# Patient Record
Sex: MALE | URBAN - METROPOLITAN AREA
[De-identification: names, ages, dates, MRNs, and addresses within clinical notes are randomized per-mention and may not be internally consistent; named-entity substitution may affect disease eponyms.]

---

## 2022-03-18 PROBLEM — S80.12XA LEG HEMATOMA, LEFT, INITIAL ENCOUNTER: Status: ACTIVE | Noted: 2018-01-21

## 2022-03-18 PROBLEM — M25.552 LEFT HIP PAIN: Status: ACTIVE | Noted: 2018-01-21

## 2022-03-19 PROBLEM — R11.10 VOMITING: Status: ACTIVE | Noted: 2018-01-21

## 2022-03-19 PROBLEM — G31.09 OTHER FRONTOTEMPORAL DEMENTIA: Status: ACTIVE | Noted: 2018-01-21

## 2022-03-19 PROBLEM — I50.23 ACUTE ON CHRONIC SYSTOLIC CONGESTIVE HEART FAILURE (HCC): Status: ACTIVE | Noted: 2020-02-19

## 2022-03-19 PROBLEM — F02.80 OTHER FRONTOTEMPORAL DEMENTIA: Status: ACTIVE | Noted: 2018-01-21

## 2022-03-19 PROBLEM — J90 PLEURAL EFFUSION: Status: ACTIVE | Noted: 2018-10-12

## 2022-03-19 PROBLEM — I48.20 CHRONIC ATRIAL FIBRILLATION (HCC): Status: ACTIVE | Noted: 2020-02-19

## 2022-03-19 PROBLEM — N30.00 ACUTE CYSTITIS WITHOUT HEMATURIA: Status: ACTIVE | Noted: 2018-01-21

## 2022-03-19 PROBLEM — E86.0 DEHYDRATION: Status: ACTIVE | Noted: 2018-01-21

## 2022-03-19 PROBLEM — I48.91 ATRIAL FIBRILLATION WITH RAPID VENTRICULAR RESPONSE (HCC): Status: ACTIVE | Noted: 2018-10-12

## 2022-03-20 PROBLEM — I50.9 CHF (CONGESTIVE HEART FAILURE) (HCC): Status: ACTIVE | Noted: 2018-10-12

## 2022-03-20 PROBLEM — R06.00 DYSPNEA: Status: ACTIVE | Noted: 2020-02-19

## 2022-03-20 PROBLEM — R09.02 HYPOXIA: Status: ACTIVE | Noted: 2020-02-19

## 2022-09-29 ENCOUNTER — APPOINTMENT (OUTPATIENT)
Dept: CT IMAGING | Age: 87
DRG: 683 | End: 2022-09-29
Payer: MEDICARE

## 2022-09-29 ENCOUNTER — HOSPITAL ENCOUNTER (INPATIENT)
Age: 87
LOS: 8 days | Discharge: NURSING, REHAB OR LONG-TERM CARE | DRG: 683 | End: 2022-10-08
Attending: EMERGENCY MEDICINE | Admitting: INTERNAL MEDICINE
Payer: MEDICARE

## 2022-09-29 ENCOUNTER — APPOINTMENT (OUTPATIENT)
Dept: GENERAL RADIOLOGY | Age: 87
DRG: 683 | End: 2022-09-29
Payer: MEDICARE

## 2022-09-29 DIAGNOSIS — E86.0 DEHYDRATION: ICD-10-CM
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
## 2022-09-29 DIAGNOSIS — N17.9 ACUTE KIDNEY INJURY (HCC): ICD-10-CM

## 2022-09-29 PROCEDURE — 51701 INSERT BLADDER CATHETER: CPT

## 2022-09-29 PROCEDURE — 85025 COMPLETE CBC W/AUTO DIFF WBC: CPT

## 2022-09-29 PROCEDURE — 96374 THER/PROPH/DIAG INJ IV PUSH: CPT

## 2022-09-29 PROCEDURE — 71045 X-RAY EXAM CHEST 1 VIEW: CPT

## 2022-09-29 PROCEDURE — 93005 ELECTROCARDIOGRAM TRACING: CPT | Performed by: STUDENT IN AN ORGANIZED HEALTH CARE EDUCATION/TRAINING PROGRAM

## 2022-09-29 PROCEDURE — 99285 EMERGENCY DEPT VISIT HI MDM: CPT

## 2022-09-29 PROCEDURE — 80048 BASIC METABOLIC PNL TOTAL CA: CPT

## 2022-09-29 PROCEDURE — 70450 CT HEAD/BRAIN W/O DYE: CPT

## 2022-09-29 PROCEDURE — 84484 ASSAY OF TROPONIN QUANT: CPT

## 2022-09-29 PROCEDURE — 72125 CT NECK SPINE W/O DYE: CPT

## 2022-09-29 PROCEDURE — 83735 ASSAY OF MAGNESIUM: CPT

## 2022-09-29 RX ORDER — MEMANTINE HYDROCHLORIDE 10 MG/1
10 TABLET ORAL 2 TIMES DAILY
COMMUNITY

## 2022-09-29 RX ORDER — MEDROXYPROGESTERONE ACETATE 10 MG/1
10 TABLET ORAL 2 TIMES DAILY
COMMUNITY

## 2022-09-29 RX ORDER — ERGOCALCIFEROL (VITAMIN D2) 1250 MCG
50000 CAPSULE ORAL WEEKLY
COMMUNITY
Start: 2022-10-01

## 2022-09-29 RX ORDER — FUROSEMIDE 40 MG/1
40 TABLET ORAL DAILY
COMMUNITY

## 2022-09-29 RX ORDER — LORAZEPAM 1 MG/1
1 TABLET ORAL SEE ADMIN INSTRUCTIONS
Status: ON HOLD | COMMUNITY
End: 2022-10-05 | Stop reason: HOSPADM

## 2022-09-29 RX ORDER — FLUOXETINE HYDROCHLORIDE 20 MG/1
20 CAPSULE ORAL DAILY
COMMUNITY

## 2022-09-29 RX ORDER — CARVEDILOL 12.5 MG/1
12.5 TABLET ORAL 2 TIMES DAILY WITH MEALS
COMMUNITY

## 2022-09-29 RX ORDER — DONEPEZIL HYDROCHLORIDE 10 MG/1
10 TABLET, FILM COATED ORAL NIGHTLY
COMMUNITY

## 2022-09-30 PROBLEM — N17.9 AKI (ACUTE KIDNEY INJURY) (HCC): Status: ACTIVE | Noted: 2022-09-30

## 2022-09-30 LAB
AMORPHOUS: ABNORMAL /HPF
ANION GAP SERPL CALCULATED.3IONS-SCNC: 16 MMOL/L (ref 3–16)
BACTERIA: ABNORMAL /HPF
BASOPHILS ABSOLUTE: 0 K/UL (ref 0–0.2)
BASOPHILS RELATIVE PERCENT: 0.2 %
BILIRUBIN URINE: ABNORMAL
BLOOD, URINE: ABNORMAL
BUN BLDV-MCNC: 23 MG/DL (ref 7–20)
CALCIUM SERPL-MCNC: 9.7 MG/DL (ref 8.3–10.6)
CHLORIDE BLD-SCNC: 104 MMOL/L (ref 99–110)
CLARITY: CLEAR
CO2: 22 MMOL/L (ref 21–32)
COLOR: YELLOW
CREAT SERPL-MCNC: 2.5 MG/DL (ref 0.8–1.3)
EKG ATRIAL RATE: 131 BPM
EKG DIAGNOSIS: NORMAL
EKG Q-T INTERVAL: 274 MS
EKG QRS DURATION: 88 MS
EKG QTC CALCULATION (BAZETT): 395 MS
EKG R AXIS: -26 DEGREES
EKG T AXIS: 143 DEGREES
EKG VENTRICULAR RATE: 125 BPM
EOSINOPHILS ABSOLUTE: 0 K/UL (ref 0–0.6)
EOSINOPHILS RELATIVE PERCENT: 0.2 %
EPITHELIAL CELLS, UA: ABNORMAL /HPF (ref 0–5)
GFR AFRICAN AMERICAN: 30
GFR NON-AFRICAN AMERICAN: 24
GLUCOSE BLD-MCNC: 125 MG/DL (ref 70–99)
GLUCOSE URINE: NEGATIVE MG/DL
HCT VFR BLD CALC: 42.9 % (ref 40.5–52.5)
HEMOGLOBIN: 14.1 G/DL (ref 13.5–17.5)
KETONES, URINE: 15 MG/DL
LEUKOCYTE ESTERASE, URINE: ABNORMAL
LYMPHOCYTES ABSOLUTE: 2.6 K/UL (ref 1–5.1)
LYMPHOCYTES RELATIVE PERCENT: 20.6 %
MAGNESIUM: 2.3 MG/DL (ref 1.8–2.4)
MCH RBC QN AUTO: 29.2 PG (ref 26–34)
MCHC RBC AUTO-ENTMCNC: 32.9 G/DL (ref 31–36)
MCV RBC AUTO: 88.8 FL (ref 80–100)
MICROSCOPIC EXAMINATION: YES
MONOCYTES ABSOLUTE: 1.3 K/UL (ref 0–1.3)
MONOCYTES RELATIVE PERCENT: 10.2 %
MUCUS: ABNORMAL /LPF
NEUTROPHILS ABSOLUTE: 8.6 K/UL (ref 1.7–7.7)
NEUTROPHILS RELATIVE PERCENT: 68.8 %
NITRITE, URINE: NEGATIVE
PDW BLD-RTO: 15 % (ref 12.4–15.4)
PH UA: 6 (ref 5–8)
PLATELET # BLD: 210 K/UL (ref 135–450)
PMV BLD AUTO: 8.2 FL (ref 5–10.5)
POTASSIUM REFLEX MAGNESIUM: 4.5 MMOL/L (ref 3.5–5.1)
PROTEIN UA: 100 MG/DL
RBC # BLD: 4.84 M/UL (ref 4.2–5.9)
RBC UA: ABNORMAL /HPF (ref 0–4)
SODIUM BLD-SCNC: 142 MMOL/L (ref 136–145)
SPECIFIC GRAVITY UA: 1.02 (ref 1–1.03)
TROPONIN: 0.02 NG/ML
TROPONIN: 0.03 NG/ML
URINE REFLEX TO CULTURE: YES
URINE TYPE: ABNORMAL
UROBILINOGEN, URINE: 1 E.U./DL
WBC # BLD: 12.5 K/UL (ref 4–11)
WBC UA: >100 /HPF (ref 0–5)

## 2022-09-30 PROCEDURE — 87086 URINE CULTURE/COLONY COUNT: CPT

## 2022-09-30 PROCEDURE — 97535 SELF CARE MNGMENT TRAINING: CPT

## 2022-09-30 PROCEDURE — 97167 OT EVAL HIGH COMPLEX 60 MIN: CPT

## 2022-09-30 PROCEDURE — 6360000002 HC RX W HCPCS: Performed by: STUDENT IN AN ORGANIZED HEALTH CARE EDUCATION/TRAINING PROGRAM

## 2022-09-30 PROCEDURE — 6370000000 HC RX 637 (ALT 250 FOR IP): Performed by: INTERNAL MEDICINE

## 2022-09-30 PROCEDURE — 87077 CULTURE AEROBIC IDENTIFY: CPT

## 2022-09-30 PROCEDURE — 97116 GAIT TRAINING THERAPY: CPT

## 2022-09-30 PROCEDURE — 97162 PT EVAL MOD COMPLEX 30 MIN: CPT

## 2022-09-30 PROCEDURE — 2580000003 HC RX 258: Performed by: STUDENT IN AN ORGANIZED HEALTH CARE EDUCATION/TRAINING PROGRAM

## 2022-09-30 PROCEDURE — 2580000003 HC RX 258: Performed by: INTERNAL MEDICINE

## 2022-09-30 PROCEDURE — 1200000000 HC SEMI PRIVATE

## 2022-09-30 PROCEDURE — 97530 THERAPEUTIC ACTIVITIES: CPT

## 2022-09-30 PROCEDURE — 81001 URINALYSIS AUTO W/SCOPE: CPT

## 2022-09-30 PROCEDURE — 84484 ASSAY OF TROPONIN QUANT: CPT

## 2022-09-30 PROCEDURE — 99223 1ST HOSP IP/OBS HIGH 75: CPT | Performed by: NURSE PRACTITIONER

## 2022-09-30 PROCEDURE — 6370000000 HC RX 637 (ALT 250 FOR IP): Performed by: NURSE PRACTITIONER

## 2022-09-30 RX ORDER — SODIUM CHLORIDE 0.9 % (FLUSH) 0.9 %
5-40 SYRINGE (ML) INJECTION PRN
Status: DISCONTINUED | OUTPATIENT
Start: 2022-09-30 | End: 2022-10-08 | Stop reason: HOSPADM

## 2022-09-30 RX ORDER — CIMETIDINE HYDROCHLORIDE ORAL SOLUTION 300 MG/5ML
800 SOLUTION ORAL 2 TIMES DAILY
Status: DISCONTINUED | OUTPATIENT
Start: 2022-09-30 | End: 2022-10-06

## 2022-09-30 RX ORDER — MEMANTINE HYDROCHLORIDE 10 MG/1
10 TABLET ORAL 2 TIMES DAILY
Status: DISCONTINUED | OUTPATIENT
Start: 2022-09-30 | End: 2022-10-08 | Stop reason: HOSPADM

## 2022-09-30 RX ORDER — DONEPEZIL HYDROCHLORIDE 10 MG/1
10 TABLET, FILM COATED ORAL NIGHTLY
Status: DISCONTINUED | OUTPATIENT
Start: 2022-09-30 | End: 2022-10-08 | Stop reason: HOSPADM

## 2022-09-30 RX ORDER — SODIUM CHLORIDE 9 MG/ML
INJECTION, SOLUTION INTRAVENOUS PRN
Status: DISCONTINUED | OUTPATIENT
Start: 2022-09-30 | End: 2022-10-08 | Stop reason: HOSPADM

## 2022-09-30 RX ORDER — QUETIAPINE FUMARATE 100 MG/1
50 TABLET, FILM COATED ORAL DAILY
Status: ON HOLD | COMMUNITY
End: 2022-10-08 | Stop reason: HOSPADM

## 2022-09-30 RX ORDER — TRAZODONE HYDROCHLORIDE 50 MG/1
50 TABLET ORAL NIGHTLY
Status: ON HOLD | COMMUNITY
End: 2022-10-05 | Stop reason: SDUPTHER

## 2022-09-30 RX ORDER — SODIUM CHLORIDE, SODIUM LACTATE, POTASSIUM CHLORIDE, CALCIUM CHLORIDE 600; 310; 30; 20 MG/100ML; MG/100ML; MG/100ML; MG/100ML
1000 INJECTION, SOLUTION INTRAVENOUS CONTINUOUS
Status: DISCONTINUED | OUTPATIENT
Start: 2022-09-30 | End: 2022-10-01

## 2022-09-30 RX ORDER — POTASSIUM CHLORIDE 750 MG/1
10 CAPSULE, EXTENDED RELEASE ORAL DAILY
COMMUNITY

## 2022-09-30 RX ORDER — ASPIRIN 81 MG/1
81 TABLET ORAL DAILY
COMMUNITY

## 2022-09-30 RX ORDER — ONDANSETRON 2 MG/ML
4 INJECTION INTRAMUSCULAR; INTRAVENOUS EVERY 6 HOURS PRN
Status: DISCONTINUED | OUTPATIENT
Start: 2022-09-30 | End: 2022-10-08 | Stop reason: HOSPADM

## 2022-09-30 RX ORDER — SODIUM CHLORIDE 9 MG/ML
INJECTION, SOLUTION INTRAVENOUS CONTINUOUS
Status: DISCONTINUED | OUTPATIENT
Start: 2022-09-30 | End: 2022-09-30

## 2022-09-30 RX ORDER — CASTOR OIL AND BALSAM, PERU 788; 87 MG/G; MG/G
OINTMENT TOPICAL 2 TIMES DAILY
Status: DISCONTINUED | OUTPATIENT
Start: 2022-09-30 | End: 2022-10-08 | Stop reason: HOSPADM

## 2022-09-30 RX ORDER — MEDROXYPROGESTERONE ACETATE 10 MG/1
10 TABLET ORAL 2 TIMES DAILY
Status: DISCONTINUED | OUTPATIENT
Start: 2022-09-30 | End: 2022-10-08 | Stop reason: HOSPADM

## 2022-09-30 RX ORDER — SODIUM CHLORIDE 0.9 % (FLUSH) 0.9 %
5-40 SYRINGE (ML) INJECTION EVERY 12 HOURS SCHEDULED
Status: DISCONTINUED | OUTPATIENT
Start: 2022-09-30 | End: 2022-10-08 | Stop reason: HOSPADM

## 2022-09-30 RX ORDER — QUETIAPINE FUMARATE 100 MG/1
150 TABLET, FILM COATED ORAL NIGHTLY
Status: ON HOLD | COMMUNITY
End: 2022-10-05 | Stop reason: HOSPADM

## 2022-09-30 RX ORDER — ONDANSETRON 4 MG/1
4 TABLET, ORALLY DISINTEGRATING ORAL EVERY 8 HOURS PRN
Status: DISCONTINUED | OUTPATIENT
Start: 2022-09-30 | End: 2022-10-08 | Stop reason: HOSPADM

## 2022-09-30 RX ORDER — POLYETHYLENE GLYCOL 3350 17 G/17G
17 POWDER, FOR SOLUTION ORAL DAILY PRN
Status: DISCONTINUED | OUTPATIENT
Start: 2022-09-30 | End: 2022-10-08 | Stop reason: HOSPADM

## 2022-09-30 RX ORDER — ACETAMINOPHEN 325 MG/1
650 TABLET ORAL EVERY 6 HOURS PRN
Status: DISCONTINUED | OUTPATIENT
Start: 2022-09-30 | End: 2022-10-08 | Stop reason: HOSPADM

## 2022-09-30 RX ORDER — MONTELUKAST SODIUM 10 MG/1
10 TABLET ORAL DAILY
COMMUNITY

## 2022-09-30 RX ORDER — ACETAMINOPHEN 650 MG/1
650 SUPPOSITORY RECTAL EVERY 6 HOURS PRN
Status: DISCONTINUED | OUTPATIENT
Start: 2022-09-30 | End: 2022-10-08 | Stop reason: HOSPADM

## 2022-09-30 RX ADMIN — Medication: at 18:11

## 2022-09-30 RX ADMIN — CIMETIDINE HYDROCHLORIDE ORAL SOLUTION 800 MG: 300 SOLUTION ORAL at 21:20

## 2022-09-30 RX ADMIN — DEXTROSE MONOHYDRATE 1000 MG: 5 INJECTION INTRAVENOUS at 02:12

## 2022-09-30 RX ADMIN — SODIUM CHLORIDE, POTASSIUM CHLORIDE, SODIUM LACTATE AND CALCIUM CHLORIDE 1000 ML: 600; 310; 30; 20 INJECTION, SOLUTION INTRAVENOUS at 11:02

## 2022-09-30 RX ADMIN — Medication: at 21:18

## 2022-09-30 RX ADMIN — SODIUM CHLORIDE, POTASSIUM CHLORIDE, SODIUM LACTATE AND CALCIUM CHLORIDE 1000 ML: 600; 310; 30; 20 INJECTION, SOLUTION INTRAVENOUS at 01:12

## 2022-09-30 RX ADMIN — SODIUM CHLORIDE, PRESERVATIVE FREE 10 ML: 5 INJECTION INTRAVENOUS at 21:17

## 2022-09-30 RX ADMIN — MEMANTINE HYDROCHLORIDE 10 MG: 10 TABLET ORAL at 21:16

## 2022-09-30 RX ADMIN — DONEPEZIL HYDROCHLORIDE 10 MG: 10 TABLET, FILM COATED ORAL at 21:16

## 2022-09-30 RX ADMIN — SODIUM CHLORIDE, POTASSIUM CHLORIDE, SODIUM LACTATE AND CALCIUM CHLORIDE 1000 ML: 600; 310; 30; 20 INJECTION, SOLUTION INTRAVENOUS at 21:25

## 2022-09-30 RX ADMIN — Medication: at 22:05

## 2022-09-30 RX ADMIN — MEDROXYPROGESTERONE ACETATE 10 MG: 10 TABLET ORAL at 21:18

## 2022-09-30 ASSESSMENT — PAIN SCALES - PAIN ASSESSMENT IN ADVANCED DEMENTIA (PAINAD)
BREATHING: 0
FACIALEXPRESSION: 0
NEGVOCALIZATION: 0
FACIALEXPRESSION: 0
TOTALSCORE: 0
CONSOLABILITY: 0
BODYLANGUAGE: 0
CONSOLABILITY: 0
TOTALSCORE: 0
BODYLANGUAGE: 0
BODYLANGUAGE: 0
TOTALSCORE: 0
TOTALSCORE: 0
BREATHING: 0
BODYLANGUAGE: 0
CONSOLABILITY: 0
NEGVOCALIZATION: 0
BREATHING: 0
BREATHING: 0
NEGVOCALIZATION: 0
TOTALSCORE: 0
CONSOLABILITY: 0
CONSOLABILITY: 0
FACIALEXPRESSION: 0
FACIALEXPRESSION: 0
NEGVOCALIZATION: 0
NEGVOCALIZATION: 0
BODYLANGUAGE: 0
FACIALEXPRESSION: 0
BREATHING: 0

## 2022-09-30 NOTE — PROGRESS NOTES
Physical Therapy  Facility/Department: [address]  Physical Therapy Initial Assessment and treatment/discharge    Name: David Lynch  : 1934  MRN: [id number]  Date of Service: 2022    Discharge Recommendations:    David Lynch scored a 10/24 on the AM-PAC short mobility form. At this time, no further PT is recommended upon discharge. Recommend patient returns to prior setting with prior services. PT Equipment Recommendations  Equipment Needed: No      Patient Diagnosis(es): The primary encounter diagnosis was Fall, initial encounter. Diagnoses of Acute kidney injury ([address] and Dehydration were also pertinent to this visit. Past Medical History:  has a past medical history of Systolic CHF ([address] Past Surgical History:  has no past surgical history on file. Assessment   Body Structures, Functions, Activity Limitations Requiring Skilled Therapeutic Intervention: Decreased functional mobility ; Decreased safe awareness;Decreased cognition;Decreased endurance;Decreased balance;Decreased strength  Assessment: Patient tolerated session fair with mobility being limited due to overall decreased cognition. Patient resistive to bed mobility requiring total assist x 2 to transfer from supine to sit. Patient able to transfer to stand and ambulate with mod assist/HHA requiring physical assist for guidance and initiation of all movements. Patient requiring increased time to transfer to sit on toilet due to difficulty understanding verbal and tactile cues for completion of tasks. Patient with advanced dementia, disoriented x 4 with minimal command following throughout session. Patient is from memory care unit where he is typically independent with ambulation per chart. Patient functioning below baseline level but with overall limited ability to participate and progress with skilled PT. Recommend patient return to memory care with PT evaluation to determine further needs.   Treatment Diagnosis: impaired mobility associated with dehydration  Therapy Prognosis: Poor  Decision Making: Medium Complexity  Requires PT Follow-Up: No  Activity Tolerance  Activity Tolerance: Treatment limited secondary to decreased cognition     Plan   Physcial Therapy Plan  General Plan: Discharge with evaluation only  Safety Devices  Type of Devices: Telesitter in use, Left in chair, Call light within reach, Chair alarm in place, Gait belt (chair in reclined position)     Restrictions  Position Activity Restriction  Other position/activity restrictions: up with assistance     Subjective   Pain: Patient unable to verbalize  General  Chart Reviewed: Yes  Patient assessed for rehabilitation services?: Yes  Additional Pertinent Hx: Patient is an 81 y/o male admitted 9/29 from nursing facility with suspected fall. CT head (+) for Chronic bilateral cerebral convexity subdural hematomas, (-) for acute findings. Chest x-ray (-) for acute findings. Response To Previous Treatment: Not applicable  Family / Caregiver Present: No  Referring Practitioner: Keith Weinberg MD  Referral Date : 09/30/22  Diagnosis: dehydration  Follows Commands: Impaired  Other (Comment): minimal command following  General Comment  Comments: Patient supine in bed upon arrival.  Subjective  Subjective: Patient sleeping but aroused with sternal rub; RN cleared patient for treatment.          Social/Functional History  Social/Functional History  Type of Home: Facility [facility]  Additional Comments: Patient unable to provide any information on prior level of function; per chart, it appears that patient was an independent ambulator but admitted with fall  Vision/Hearing  Vision  Vision:  (unable to accuarately assess due to advanced dementia but appears [facility] with ambulation)  Hearing  Hearing:  (unable to accurately assess due to advanced dementia but appears that patient may be hard of hearing)    Cognition   Orientation  Overall Orientation Status: Impaired  Orientation Level: Disoriented X4  Cognition  Overall Cognitive Status: Exceptions  Arousal/Alertness: Delayed responses to stimuli  Following Commands: Inconsistently follows commands (continuous verbal and tactile cues required for completion of tasks, hand over hand assist, physical guidance for ambulation and sit<>stand transfers)  Attention Span: Difficulty attending to directions  Memory: Decreased recall of biographical Information  Safety Judgement: Decreased awareness of need for assistance;Decreased awareness of need for safety  Problem Solving: Decreased awareness of errors  Insights: Not aware of deficits  Initiation: Requires cues for all  Sequencing: Requires cues for all  Cognition Comment: advanced dementia     Objective   Heart Rate: 96  Heart Rate Source: Monitor  BP: 118/75  BP Location: Left upper arm  BP Method: Automatic  MAP (Calculated): 89.33  Resp: 18  SpO2: 96 %  O2 Device: None (Room air)              AROM RLE (degrees)  RLE AROM: WFL  AROM LLE (degrees)  LLE AROM : WFL  Strength RLE  Strength RLE: Exception  Comment: appears grossly weakened with functional mobility  Strength LLE  Strength LLE: Exception  Comment: appears grossly weakened with functional mobility        Balance  Sitting:  (CGA progressing to SBA)  Standing:  (CG/Min A static standing)  Gait  Overall Level of Assistance: Moderate assistance (bilateral hand held assist - 3' bed to chair + ~10' (x2) to/from toilet)  Bed mobility  Supine to Sit: 2 Person assistance;Dependent/Total (resistive)  Scooting: Maximal assistance (to EOB)  Bed Mobility Comments: patient sitting up in bedside chair at end of session  Transfers  Sit to Stand: Minimal Assistance; Moderate Assistance (min/mod assist from EOB and from toilet, no initiation of tasks with physical assist to initiate)  Stand to Sit: Minimal Assistance; Moderate Assistance (min/mod assist x 1 to toilet and to bedside chair, verbal cues and physical assist to initiate movement, resistive at times)  Bed to Chair: Moderate assistance (to take steps with HHA)  Ambulation  Surface: Level tile  Device: Hand-Held Assist  Assistance: Moderate assistance  Quality of Gait: gait mildly unsteady but no overt loss of balance noted, significantly decreased step length/height bilaterally with shuffling steps, decreased leon, physical guidance for steps  Distance: 5-6 steps from EOB to bedside chair, 10' into bathroom, 10' back to bedside chair  More Ambulation?: No  Stairs/Curb  Stairs?: No     Balance  Sitting - Static: Fair  Standing - Static: Fair;- (min assist)  Standing - Dynamic: Poor (mod assist to ambulate)           OutComes Score                                                  AM-PAC Score  AM-PAC Inpatient Mobility Raw Score : 10 (09/30/22 1027)  AM-PAC Inpatient T-Scale Score : 32.29 (09/30/22 1027)  Mobility Inpatient CMS 0-100% Score: 76.75 (09/30/22 1027)  Mobility Inpatient CMS G-Code Modifier : CL (09/30/22 1027)          Tinneti Score       Goals  Short Term Goals  Time Frame for Short Term Goals: none; patient will be discharged from acute PT  Patient Goals   Patient Goals : unable to state       Education  Patient Education  Education Given To: Patient  Education Provided: Role of Therapy  Education Method: Verbal;Demonstration  Barriers to Learning: Cognition  Education Outcome: Unable to demonstrate understanding; Unable to verbalize      Therapy Time   Individual Concurrent Group Co-treatment   Time In 0810         Time Out 0850         Minutes 40         Timed Code Treatment Minutes: 25 Minutes   Timed Code Treatment Minutes:  25 Minutes    Total Treatment Minutes:    25 minutes treatment + 15 minutes evaluation = 40 total treatment minutes    Mendoza ORTIZ [id number]

## 2022-09-30 NOTE — H&P
Hospital Medicine History & Physical      PCP: Peterson Mays DO    Date of Admission: 9/29/2022    Date of Service: Pt seen/examined on 9/30/2022 and Admitted to Inpatient with expected LOS greater than two midnights due to medical therapy. Chief Complaint: Suspected fall      History Of Present Illness:   80 y.o. male who presents from his SNF for suspected fall as the nursing staff has noticed some bruising on his face (not appreciated currently). Patient has advanced dementia and it has been his first day at this facility. Patient has history of wandering around and he was found in another resident's room wedged between the bed and the wall with possible bruise to his face. Patient was sent to ER for further evaluation. Upon my evaluation patient is awake but does not follow commands and unable to contribute to history given his advanced dementia. Past Medical History:      No past medical history on file. Unable to obtain given advanced dementia  Past Surgical History:      No past surgical history on file. Unable to obtain given advanced dementia  Medications Prior to Admission:      Prior to Admission medications    Medication Sig Start Date End Date Taking? Authorizing Provider   carvedilol (COREG) 12.5 MG tablet Take 12.5 mg by mouth 2 times daily (with meals)   Yes Historical Provider, MD   CIMETIDINE 200 PO Take 200 mg by mouth in the morning and at bedtime   Yes Historical Provider, MD   donepezil (ARICEPT) 10 MG tablet Take 10 mg by mouth nightly   Yes Historical Provider, MD   ergocalciferol (ERGOCALCIFEROL) 1.25 MG (81705 UT) capsule Take 50,000 Units by mouth once a week   Yes Historical Provider, MD   FLUoxetine (PROZAC) 20 MG capsule Take 20 mg by mouth daily   Yes Historical Provider, MD   furosemide (LASIX) 40 MG tablet Take 40 mg by mouth in the morning and 40 mg in the evening.    Yes Historical Provider, MD   LORazepam (ATIVAN) 1 MG tablet Take 1 mg by mouth every 6 hours as needed for Anxiety. Yes Historical Provider, MD   memantine (NAMENDA) 10 MG tablet Take 10 mg by mouth 2 times daily   Yes Historical Provider, MD   medroxyPROGESTERone (PROVERA) 10 MG tablet Take 10 mg by mouth in the morning and at bedtime   Yes Historical Provider, MD       Allergies:  Patient has no known allergies. Social History:      The patient currently lives at [facility]:   has no history on file for tobacco use. ETOH:   has no history on file for alcohol use. E-cigarette/Vaping       Questions Responses    E-cigarette/Vaping Use     Start Date     Passive Exposure     Quit Date     Counseling Given     Comments               Family History:    Reviewed and negative in regards to presenting illness/complaint. No family history on file. Unable to obtain due to advanced dementia    REVIEW OF SYSTEMS COMPLETED:     Patient cannot contribute to history due to advanced dementia    PHYSICAL EXAM PERFORMED:    BP 98/65   Pulse (!) 104   Temp 97.9 °F (36.6 °C) (Oral)   Resp 16   Wt 197 lb 8 oz (89.6 kg)   SpO2 97%     General appearance:  No apparent distress, appears stated age. Pleasantly confused and does not follow commands. HEENT:  Normal cephalic, atraumatic without obvious deformity. Pupils equal, round, and reactive to light. Extra ocular muscles intact. Conjunctivae/corneas clear. No bruising noted on the face  Neck: Supple, with full range of motion. No jugular venous distention. Trachea midline. Respiratory:  Normal respiratory effort. Clear to auscultation, bilaterally without Rales/Wheezes/Rhonchi. Cardiovascular: Irregularly irregular rhythm, tachycardic, with normal S1/S2 without murmurs, rubs or gallops. Abdomen: Soft, non-tender, non-distended with normal bowel sounds. Musculoskeletal:  No clubbing, cyanosis or edema bilaterally. Full range of motion without deformity. Skin: Skin color, texture, turgor normal.  No rashes or lesions.   Neurologic:  Cranial nerves: II-XII intact, grossly non-focal.  Psychiatric:  Alert, pleasantly confused. Does not follow commands. Capillary Refill: Brisk,3 seconds, normal  Peripheral Pulses: +2 palpable, equal bilaterally       Labs:     Recent Labs     09/29/22 2339   WBC 12.5*   HGB 14.1   HCT 42.9        Recent Labs     09/29/22 2339      K 4.5      CO2 22   BUN 23*   CREATININE 2.5*   CALCIUM 9.7     No results for input(s): AST, ALT, BILIDIR, BILITOT, ALKPHOS in the last 72 hours. No results for input(s): INR in the last 72 hours. Recent Labs     09/29/22 2339 09/30/22  0036   TROPONINI 0.03* 0.02*       Urinalysis:      Lab Results   Component Value Date/Time    NITRU Negative 09/30/2022 01:16 AM    WBCUA >100 09/30/2022 01:16 AM    BACTERIA 3+ 09/30/2022 01:16 AM    RBCUA see below 09/30/2022 01:16 AM    BLOODU TRACE-INTACT 09/30/2022 01:16 AM    SPECGRAV 1.025 09/30/2022 01:16 AM    GLUCOSEU Negative 09/30/2022 01:16 AM       Radiology:     CXR: I have reviewed the CXR with the following interpretation: As below  EKG:  I have reviewed the EKG with the following interpretation: Atrial fibrillation, VR = 125, normal intervals, no acute ST-T changes    XR CHEST PORTABLE   Final Result      No acute pulmonary disease. CT CSpine W/O Contrast   Final Result      1. Chronic bilateral cerebral convexity subdural hematomas. No midline shift. 2.  No acute intracranial abnormality. 3.  No evidence of acute fracture in the cervical spine. 4.  Large anterior osteophytes at C2-C5 level. Note that this is often incidental and asymptomatic but can contribute to dysphagia. CT Head W/O Contrast   Final Result      1. Chronic bilateral cerebral convexity subdural hematomas. No midline shift. 2.  No acute intracranial abnormality. 3.  No evidence of acute fracture in the cervical spine. 4.  Large anterior osteophytes at C2-C5 level.  Note that this is often incidental and asymptomatic but can contribute to dysphagia. Consults:    IP CONSULT TO NEUROSURGERY  IP CONSULT TO HOSPITALIST  IP CONSULT TO SOCIAL WORK    ASSESSMENT:    Active Hospital Problems    Diagnosis Date Noted    YANY (acute kidney injury) ([facility] [address] [N17.9] 09/30/2022     Priority: Medium   #Suspected fall  #Chronic bilateral SDH with no midline shift  #CT cervical spine with large anterior osteophytes at C2-C5 level  #YANY  #Suspected acute cystitis  #Elevated troponin, trending down. EKG nonischemic. Likely secondary to tachycardia  #Advanced dementia  #Mild leukocytosis with left shift    PLAN:  -Continue on IV hydration  -Hold Lasix  -Monitor renal function and electrolytes  -Trend cardiac enzymes  -Heart rate control, with trial of IV hydration and treatment of infection  -Continue on IV antibiotics, follow urine cultures  -Neurosurgery has been consulted by ED provider  -Fall precautions  -PT/OT  -Social service consult for discharge planning  -Continue on Aricept and Namenda  -Will need verification of nursing home medications in a.m.  -Supportive therapy      DVT Prophylaxis: SCDs  Diet: ADULT DIET; Regular  Code Status: Full Code    PT/OT Eval Status: As tolerated with assistance and fall precautions    Dispo -GMF with telemetry       Kasey Adair MD    Thank you Luis Carpio DO for the opportunity to be involved in this patient's care. If you have any questions or concerns please feel free to contact me at [phone].

## 2022-09-30 NOTE — ED PROVIDER NOTES
ED Attending Attestation Note     Date of evaluation: 9/29/2022    This patient was seen by the resident. I have seen and examined the patient, agree with the workup, evaluation, management and diagnosis. The care plan has been discussed. I have reviewed the ECG and concur with the resident's interpretation. My assessment reveals patient presents from nursing facility for unwitnessed fall. Patient was reportedly found down in a room in the facility without a witness to the fall. Patient has dementia so cannot describe circumstances of the fall. Patient has no evidence of trauma on exam.  Will check baseline laboratory studies, imaging.      Madelaine Dang MD  09/29/22 2830

## 2022-09-30 NOTE — CONSULTS
NEUROSURGERY CONSULT NOTE       Jaswinder Woo DO is requesting this consult. Reason for Consult: Bilateral hygromas  Admission Chief Complaint: fall at nursing facility     History of Present Illness     Dom Dobson is a 80 y.o. y/o male with dementia w/ behavioral issues, CHF who presents after being found down at nursing facility. He has been at new nursing facility for only 1 day. He was discharged from prior facility due to behavioral concerns. Family has had a difficult time placing patient in new facility. Had 30 day stay at [facility] (psychiatry) and was discharged on stable medication regimen, it appears his newer facilities do not have an accurate medication list from his stay at [facility]. He was found to have UTI as well and is being currently treated for this. Per daughter at bedside patient appears to be at baseline. REVIEW OF SYSTEMS:   Patient does not answer questions. Past Medical, Surgical, Family, and Social History   PAST MEDICAL HISTORY:  Past Medical History:   Diagnosis Date    Systolic CHF [facility])      SURGICAL HISTORY:  No past surgical history on file. FAMILY HISTORY & SOCIAL HISTORY:  Family history non-contributory  No family history on file. Allergies & Outpatient Medications   ALLERGIES:  No Known Allergies  HOME MEDICATIONS:  Current Discharge Medication List        CONTINUE these medications which have NOT CHANGED    Details   carvedilol (COREG) 12.5 MG tablet Take 12.5 mg by mouth 2 times daily (with meals)      CIMETIDINE 200 PO Take 200 mg by mouth in the morning and at bedtime      donepezil (ARICEPT) 10 MG tablet Take 10 mg by mouth nightly      ergocalciferol (ERGOCALCIFEROL) 1.25 MG (73891 UT) capsule Take 50,000 Units by mouth once a week      FLUoxetine (PROZAC) 20 MG capsule Take 20 mg by mouth daily      furosemide (LASIX) 40 MG tablet Take 40 mg by mouth in the morning and 40 mg in the evening.       LORazepam (ATIVAN) 1 MG tablet Take 1 mg by mouth every 6 hours as needed for Anxiety. memantine (NAMENDA) 10 MG tablet Take 10 mg by mouth 2 times daily      medroxyPROGESTERone (PROVERA) 10 MG tablet Take 10 mg by mouth in the morning and at bedtime               Physical Exam   PHYSICAL EXAM:  /75   Pulse 96   Temp 97.6 °F (36.4 °C) (Axillary)   Resp 18   Wt 197 lb 8 oz (89.6 kg)   SpO2 96%     General Alert, no distress, well-nourished    Neurologic  Mental status:   Alert, regards, does not answer any questions  Mumbles some words    Cranial nerves:   Pupils equal  Gaze conjugate  Face symmetric     Motor Exam:  Moves all extremities - no focal weakness noted      Sensory: SARAH  Tone: normal in all 4 extremities    Cardiovascular: Warm, appears well perfused   Respiratory: Easy, non-labored respiratory pattern   Abdominal: Abdomen is without distention   Extremities: Upper and lower extremities are atraumatic in appearance without deformity. Diagnostic Results   IMAGES:  Images personally reviewed and agree w/ radiology interpretation. Head CT  Impression       1. Chronic bilateral cerebral convexity subdural hematomas. No midline shift. 2.  No acute intracranial abnormality. 3.  No evidence of acute fracture in the cervical spine. 4.  Large anterior osteophytes at C2-C5 level. Note that this is often incidental and asymptomatic but can contribute to dysphagia. LABS:  All results below personally reviewed. Pertinent positives & negatives are addressed in Impression & Recommendations below.      LABS   Metabolic Panel Recent Labs     09/29/22  2339      K 4.5      CO2 22   BUN 23*   CREATININE 2.5*   GLUCOSE 125*   CALCIUM 9.7   MG 2.30      CBC / Coags Recent Labs     09/29/22  2339   WBC 12.5*   RBC 4.84   HGB 14.1   HCT 42.9         Other Lab Results   Component Value Date/Time    LABA1C 6.0 10/05/2019 08:26 AM    LDLCALC 89.8 10/05/2019 08:26 AM    TRIG 76 10/05/2019 08:26 AM    [id number] 301 10/05/2019 08:26 AM    FOLATE 17.5 10/05/2019 08:26 AM     No results found for: PHENYTOIN, KEPPRA, LACOSA, LAMO, VALPROATE, LACTSEPSIS, LACTA       CURRENT SCHEDULED MEDICATIONS   Inpatient Medications     memantine, 10 mg, Oral, BID    donepezil, 10 mg, Oral, Nightly    sodium chloride flush, 5-40 mL, IntraVENous, 2 times per day    [START ON 10/1/2022] cefTRIAXone (ROCEPHIN) IV, 1,000 mg, IntraVENous, Q24H   Infusions    lactated ringers 1,000 mL (09/30/22 0112)    sodium chloride        Antibiotics   Recent Abx Admin                     cefTRIAXone (ROCEPHIN) 1,000 mg in dextrose 5 % 50 mL IVPB mini-bag (mg) 1,000 mg New Bag 09/30/22 0212                       IMPRESSION & RECOMMENDATIONS     Patient is an 81 y/o M presents s/p fall at nursing facility, head Ct shows bilateral hygroma, severe atrophy consistent with patients history of dementia    Bilateral hygromas show no signs of compression on the brain tissue. Unlikely to be related to acute changes in mental status or balance. Discussed medications with Daughter - reordered home medication Provera / Tagamet - patient placed on these medications to help with sexual behaviors. Continue home Aricept / Namenda   Avoid benzos as able    He would benefit from outpatient consult with Neurology  No benefit for inpatient consult with his acute changes likely related to UTI. Recommend follow up w/ Dr. Santo Cantu at [address] [place] [address] [id number]    No neurosurgical follow up needed  We will sign off  Please call with questions.          TIFFANIE Erickson - CNP   9/30/2022 9:40 AM

## 2022-09-30 NOTE — PROGRESS NOTES
This is an 79 yo male admitted earlier this morning by my colleague. Seen with family at bedside. They report he appears to be doing much better.      Continue treatment for UTI  Resume home meds - advised family they can bring in to hospital and can dose   Continue IV fluids  Monitor for urinary retention  Neurosurgery consulted, outpatient follow-up with neurology recommended  PT/OT    Yelitza Benitez DO  Attending hospitalist

## 2022-09-30 NOTE — PROGRESS NOTES
Occupational Therapy  Facility/Department: [facility] 113 6 [facility] 166  Occupational Therapy Initial Assessment and Treatment  Discharge    Name: Jhony Valencia  : 1934  MRN: [id number]  Date of Service: 2022    Discharge Recommendations:  Jhony Valencia scored a 8/24 on the -[facility] ADL Inpatient form. At this time, no further OT is recommended upon discharge due to appears baseline status - requires assist for ADLs at baseline. Recommend patient returns to prior setting with prior services. OT Equipment Recommendations  Equipment Needed: No       Patient Diagnosis(es): The primary encounter diagnosis was Fall, initial encounter. Diagnoses of Acute kidney injury ([facility] and Dehydration were also pertinent to this visit. Past Medical History:  has a past medical history of Systolic CHF ([facility] Past Surgical History:  has no past surgical history on file. Assessment   Assessment: Pt presenting from NH s/p apparent fall. Pt unable to provide information regarding PLOF 2* advanced dementia. Per chart review, pt ambulatory and wanders. Pt w/ signficant cognitive impairment w/ inability to follow simple directions or carryover learning. Not appropriate for skilled OT. Will sign off. Recommend pt return to NH w/ increased assist as needed. Will sign off. REQUIRES OT FOLLOW-UP: No  Activity Tolerance  Activity Tolerance: Treatment limited secondary to decreased cognition        Plan   Discharge acute OT - no further needs. Restrictions  Position Activity Restriction  Other position/activity restrictions: up with assistance    Subjective   General  Chart Reviewed: Yes  Patient assessed for rehabilitation services?: Yes  Additional Pertinent Hx: 80 y.o. M who presents with concern for fall (found in another residents room wedged between the bed and wall with possible bruise to face).        Hospital Course: CT Head:  Chronic bilateral cerebral convexity subdural hematomas, (-) acute; CT c-spine: (-) fx, Large anterior osteophytes at C2-C5 level. PMH: Advanced Dementia, Systolic CHF. Family / Caregiver Present: No  Referring Practitioner: Yamini Amanda MD  Diagnosis: Dehydration    Subjective  Subjective: In bed on arrival - sleeping. Responds to name, but unable to state name. Few verbalizations that were unintelligible. Did say \"Can I have water? \"    Patient unable to verbalize    Social/Functional History  Social/Functional History  Type of Home: Facility [facility]  Additional Comments: Patient unable to provide any information on prior level of function; per chart, it appears that patient was an independent ambulator but admitted with fall         Objective                  Safety Devices  Type of Devices: Telesitter in use;Left in chair;Call light within reach; Chair alarm in place;Gait belt (chair in reclined position)    Balance  Sitting:  (CGA progressing to SBA)  Standing:  (CG/Min A static standing)    Gait  Overall Level of Assistance: Moderate assistance (bilateral hand held assist - 3' bed to chair + ~10' (x2) to/from toilet)    Toilet Transfers  Toilet - Technique: Ambulating  Equipment Used: Standard toilet  Toilet Transfer: Moderate assistance  Toilet Transfers Comments: Note: increased time and physical prompting to sit down on toilet    AROM:  (assisted w/ rowing w/ BUE; unable to formally assess 2* inability to follow complex directions)  Strength:  (resistive strength WFL)    ADL  Feeding: Beverage management;Stand by assistance  Grooming: Maximum assistance (wash face w/ washcloth - hand over hand initiation)  LE Dressing: Dependent/Total (change depends)  Toileting: Dependent/Total       Activity Tolerance  Activity Tolerance: Treatment limited secondary to decreased cognition    Bed mobility  Supine to Sit: 2 Person assistance;Dependent/Total (resistive)  Scooting: Maximal assistance (to EOB)    Transfers  Sit to stand:  Moderate assistance  Stand to sit: Moderate assistance  Transfer Comments: Note: physical prompting    Vision  Vision:  (unable to accuarately assess due to advanced dementia but appears [facility] with ambulation)  Hearing  Hearing:  (unable to accurately assess due to advanced dementia but appears that patient may be hard of hearing)    Cognition  Overall Cognitive Status: Exceptions  Arousal/Alertness: Delayed responses to stimuli  Following Commands: Inconsistently follows commands (continuous verbal and tactile cues required for completion of tasks, hand over hand assist, physical guidance for ambulation and sit<>stand transfers)  Attention Span: Difficulty attending to directions  Memory: Decreased recall of biographical Information  Safety Judgement: Decreased awareness of need for assistance;Decreased awareness of need for safety  Problem Solving: Decreased awareness of errors  Insights: Not aware of deficits  Initiation: Requires cues for all  Sequencing: Requires cues for all  Cognition Comment: advanced dementia  Orientation  Overall Orientation Status: Impaired  Orientation Level: Disoriented X4                    Education Given To: Patient  Education Provided: Role of Therapy  Education Method: Verbal  Barriers to Learning: Cognition  Education Outcome: Unable to verbalize; Unable to demonstrate understanding               Pt seen by OT for eval and treat.  Treatment included: bed mobility, functional transfer/mobility, functional transfer/mobility                                                           AM-PAC Score        AM-[facility] Inpatient Daily Activity Raw Score: 8 (09/30/22 1026)  AM-PAC Inpatient ADL T-Scale Score : 22.86 (09/30/22 1026)  ADL Inpatient CMS 0-100% Score: 85.69 (09/30/22 1026)  ADL Inpatient CMS G-Code Modifier : CM (09/30/22 1026)              Therapy Time   Individual Concurrent Group Co-treatment   Time In 0810         Time Out 0850         Minutes 40          Timed Code Treatment Minutes:  25 Minutes    Total Treatment Minutes: [place] OTR/L #[id number]

## 2022-09-30 NOTE — CARE COORDINATION
Case Management Assessment           Initial Evaluation                Date / Time of Evaluation: 9/30/2022 4:20 PM                 Assessment Completed by: DANNY Hollis, ARINW    Patient Name: Dom Dobson     YOB: 1934  Diagnosis: Dehydration [E86.0]  YANY (acute kidney injury) ([facility] [N17.9]  Acute kidney injury ([facility] [N17.9]  Fall, initial encounter [W19. XXXA]     Date / Time: 9/29/2022  9:31 PM    Patient Admission Status: Inpatient    If patient is discharged prior to next notation, then this note serves as note for discharge by case management. Current PCP: Evette Bernal DO  Clinic Patient: No    Chart Reviewed: Yes  Patient/ Family Interviewed: Yes    Initial assessment completed at bedside with:     Hospitalization in the last 30 days: No    Emergency Contacts:  Extended Emergency Contact Information  Primary Emergency Contact: Adam Darnell Phone: [phone]  Relation: Child    Advance Directives:   Code Status: Full Code    Healthcare Power of :   Agent:   Contact Number:     Financial  Payor: MEDICARE / Plan: Selvin March / Product Type: *No Product type* /     Pre-cert required for SNF: No    Pharmacy  No Pharmacies Listed    Potential assistance Purchasing Medications:    Does Patient want to participate in local refill/ meds to beds program?:      Meds To Beds General Rules:  1. Can ONLY be done Monday- Friday between 8:30am-5pm  2. Prescription(s) must be in pharmacy by 3pm to be filled same day  3. Copy of patient's insurance/ prescription drug card and patient face sheet must be sent along with the prescription(s)  4. Cost of Rx cannot be added to hospital bill. If financial assistance is needed, please contact unit  or ;  or  CANNOT provide pharmacy voucher for patients co-pays  5.  Patients can then  the prescription on their way out of the hospital at discharge, or pharmacy can deliver to the bedside if staff is available. (payment due at time of pick-up or delivery - cash, check, or card accepted)     Able to afford home medications/ co-pay costs: Yes    ADLS  Support Systems:      PT AM-PAC: 10 /24  OT AM-PAC: 8 /24    [place]: Gareth Agudelo [facility] [address] Memory Care/LTC  Steps: none    Plans to RETURN to current housing: Yes  Barriers to RETURNING to current housing: NA      Durable Medical Equipment  Equipment: walker    Home Oxygen and [address] prior to admission: No    Dialysis  Active with HD/PD prior to admission: No    DISCHARGE PLAN:  Disposition: [facility] (LTC): [facility]  Phone: [phone]  Fax: [phone]    Transportation PLAN for discharge: EMS transportation     Factors facilitating achievement of predicted outcomes: Caregiver support    Barriers to discharge: Medical complications    Additional Case Management Notes:  Pt is from [facility] [address] CM spoke with Korin Thacker in admissions [phone] who confirmed that pt will return to LTC and receive rehab therapy services there. She will be the weekend contact. CM will continue to follow for DC needs and recs. The Plan for Transition of Care is related to the following treatment goals of Dehydration [E86.0]  YANY (acute kidney injury) ([facility] [N17.9]  Acute kidney injury ([facility] [N17.9]  Fall, initial encounter [W19. XXXA]    The Patient and/or patient representative Rickey Long and his family were provided with a choice of provider and agrees with the discharge plan Yes    Freedom of choice list was provided with basic dialogue that supports the patient's individualized plan of care/goals and shares the quality data associated with the providers.  Yes    Care Transition patient: No    DANNY Shukla, [facility]  Case Management Department  Ph: [phone]   Fax: [phone]

## 2022-09-30 NOTE — ED PROVIDER NOTES
[address] ENCOUNTER          EM RESIDENT NOTE       Date of evaluation: 9/29/2022    Chief Complaint     Fall      of Present Illness     Diego Morillo is a 80 y.o. male who presents with concern for fall. He is a new resident at his nursing home with history of dementia including episodes of wandering. He was found in another residents room wedged between the bed and wall with possible bruise to face therefore was sent to the emergency department with concern for injury sustained in fall. At arrival he is awake but cannot contribute a history or review of systems. Information above obtained in discussion with sending facility. Review of Systems   Unable to obtain due to advanced dementia. Past Medical, Surgical, Family, and Social History     He has no past medical history on file. He has no past surgical history on file. His family history is not on file. He     Medications     Previous Medications    CARVEDILOL (COREG) 12.5 MG TABLET    Take 12.5 mg by mouth 2 times daily (with meals)    CIMETIDINE 200 PO    Take 200 mg by mouth in the morning and at bedtime    DONEPEZIL (ARICEPT) 10 MG TABLET    Take 10 mg by mouth nightly    ERGOCALCIFEROL (ERGOCALCIFEROL) 1.25 MG (69376 UT) CAPSULE    Take 50,000 Units by mouth once a week    FLUOXETINE (PROZAC) 20 MG CAPSULE    Take 20 mg by mouth daily    FUROSEMIDE (LASIX) 40 MG TABLET    Take 40 mg by mouth in the morning and 40 mg in the evening. LORAZEPAM (ATIVAN) 1 MG TABLET    Take 1 mg by mouth every 6 hours as needed for Anxiety. MEDROXYPROGESTERONE (PROVERA) 10 MG TABLET    Take 10 mg by mouth in the morning and at bedtime    MEMANTINE (NAMENDA) 10 MG TABLET    Take 10 mg by mouth 2 times daily       Allergies     He has No Known Allergies.     Physical Exam     INITIAL VITALS: BP: 100/85, Temp: 97.9 °F (36.6 °C), Heart Rate: 90, Resp: 18, SpO2: 93 %     General: alert in no distress; no verbal   Skin: warm, dry and pink without noted rashes or lesions  Head: normocephalic atraumatic  Eyes: Pupils equal, extra ocular movements grossly intact  Mouth / Pharynx: moist mucus membranes without erythema or lesions noted  Neck: full range of motion without meningismus  Chest: no external findings or tenderness; clear to auscultation  Heart: regular normal S1 and S2 without murmur  noted  Abdomen: negative external findings, + bowel sounds, soft and non tender; no rebound; non distended  Extremities: well perfused with palpable distal pulses; full range of motion grossly intact, no edema    DiagnosticResults     EKG   Interpreted in conjunction with emergencydepartment physician Sara Cantrell MD  Afib ventricular rate of 125 without ectopy; normal axis; unable to calculate MARKELL otherwise normal intervals; no evidence of acute ischemia. RADIOLOGY:  XR CHEST PORTABLE   Final Result      No acute pulmonary disease. CT CSpine W/O Contrast   Final Result      1. Chronic bilateral cerebral convexity subdural hematomas. No midline shift. 2.  No acute intracranial abnormality. 3.  No evidence of acute fracture in the cervical spine. 4.  Large anterior osteophytes at C2-C5 level. Note that this is often incidental and asymptomatic but can contribute to dysphagia. CT Head W/O Contrast   Final Result      1. Chronic bilateral cerebral convexity subdural hematomas. No midline shift. 2.  No acute intracranial abnormality. 3.  No evidence of acute fracture in the cervical spine. 4.  Large anterior osteophytes at C2-C5 level. Note that this is often incidental and asymptomatic but can contribute to dysphagia.                 LABS:   Results for orders placed or performed during the hospital encounter of 09/29/22   BMP w/ Reflex to MG   Result Value Ref Range    Sodium 142 136 - 145 mmol/L    Potassium reflex Magnesium 4.5 3.5 - 5.1 mmol/L    Chloride 104 99 - 110 mmol/L    CO2 22 21 - 32 mmol/L    Anion Gap 16 3 - 16    Glucose 125 (H) 70 - 99 mg/dL    BUN 23 (H) 7 - 20 mg/dL    Creatinine 2.5 (H) 0.8 - 1.3 mg/dL    GFR Non-African American 24 (A) >60    GFR  30 (A) >60    Calcium 9.7 8.3 - 10.6 mg/dL   CBC with Auto Differential   Result Value Ref Range    WBC 12.5 (H) 4.0 - 11.0 K/uL    RBC 4.84 4.20 - 5.90 M/uL    Hemoglobin 14.1 13.5 - 17.5 g/dL    Hematocrit 42.9 40.5 - 52.5 %    MCV 88.8 80.0 - 100.0 fL    MCH 29.2 26.0 - 34.0 pg    MCHC 32.9 31.0 - 36.0 g/dL    RDW 15.0 12.4 - 15.4 %    Platelets 489 245 - 642 K/uL    MPV 8.2 5.0 - 10.5 fL    Neutrophils % 68.8 %    Lymphocytes % 20.6 %    Monocytes % 10.2 %    Eosinophils % 0.2 %    Basophils % 0.2 %    Neutrophils Absolute 8.6 (H) 1.7 - 7.7 K/uL    Lymphocytes Absolute 2.6 1.0 - 5.1 K/uL    Monocytes Absolute 1.3 0.0 - 1.3 K/uL    Eosinophils Absolute 0.0 0.0 - 0.6 K/uL    Basophils Absolute 0.0 0.0 - 0.2 K/uL   Troponin   Result Value Ref Range    Troponin 0.03 (H) <0.01 ng/mL   Magnesium   Result Value Ref Range    Magnesium 2.30 1.80 - 2.40 mg/dL   Urinalysis with Reflex to Culture    Specimen: Urine   Result Value Ref Range    Urine Type Voided    Troponin   Result Value Ref Range    Troponin 0.02 (H) <0.01 ng/mL       ED BEDSIDE ULTRASOUND:  na    RECENT VITALS:  BP: 94/69, Temp: 97.9 °F (36.6 °C), Heart Rate: (!) 115,Resp: 18, SpO2: 95 %     Procedures     na    ED Course     Nursing Notes, Past Medical Hx, Past Surgical Hx, Social Hx, Allergies, and Family Hx were reviewed. The patient was given the followingmedications:  Orders Placed This Encounter   Medications    lactated ringers infusion 1,000 mL         CONSULTS:  [address]  / MICHELLE / Shahnaz Santamariacherri is a 80 y.o. male who presents for evaluation after a fall. There are no apparent injuries. He is noted to have HR around 120 with history of Afib; remains HDS.  Will complete labs and imaging to assess for high risk features / injury. The remainder of his ED course is as follows:    ED Course as of 09/30/22 0131   Fri Sep 30, 2022   0031 Patient remains HDS in little distress; workup is notable for chronic subdurals but unclear from record review when these occurred and perhaps have not been formally evaluated. Labs reveal YANY, mild leukocytosis, mild elevation of troponin - will trend; given this will benefit from IVF and admission after discussion with NSGY. [NG]   0045 Discussed with NSGY; will evaluate on admission but no indication for acute intervention; no AED indicated; will benefit from CT in 2 weeks to assess resolution of SDH. [NG]   0102 Will discuss with HM in anticipation of admission and this will conclude ED course. [NG]      ED Course User Index  [NG] Bethany Harris MD         This patient was also evaluated by the attending physician. All care plans werediscussed and agreed upon. Clinical Impression     1. Fall, initial encounter    2. Acute kidney injury ([address]    3. Dehydration        Disposition     PATIENT REFERRED TO:  No follow-up provider specified.     DISCHARGE MEDICATIONS:  New Prescriptions    No medications on file       DISPOSITION Decision To Admit 09/30/2022 12:32:55 AM       Bethany Harris MD  Resident  09/30/22 [id number]

## 2022-09-30 NOTE — ED TRIAGE NOTES
Patient arrived in the ER after a fall. Patient is from [address] states that the fall was un witness.

## 2022-10-01 LAB
ALBUMIN SERPL-MCNC: 3.3 G/DL (ref 3.4–5)
ANION GAP SERPL CALCULATED.3IONS-SCNC: 15 MMOL/L (ref 3–16)
ANION GAP SERPL CALCULATED.3IONS-SCNC: 15 MMOL/L (ref 3–16)
BUN BLDV-MCNC: 15 MG/DL (ref 7–20)
BUN BLDV-MCNC: 16 MG/DL (ref 7–20)
CALCIUM SERPL-MCNC: 8.5 MG/DL (ref 8.3–10.6)
CALCIUM SERPL-MCNC: 8.5 MG/DL (ref 8.3–10.6)
CHLORIDE BLD-SCNC: 105 MMOL/L (ref 99–110)
CHLORIDE BLD-SCNC: 105 MMOL/L (ref 99–110)
CO2: 17 MMOL/L (ref 21–32)
CO2: 17 MMOL/L (ref 21–32)
CREAT SERPL-MCNC: 1 MG/DL (ref 0.8–1.3)
CREAT SERPL-MCNC: 1 MG/DL (ref 0.8–1.3)
GFR AFRICAN AMERICAN: >60
GFR AFRICAN AMERICAN: >60
GFR NON-AFRICAN AMERICAN: >60
GFR NON-AFRICAN AMERICAN: >60
GLUCOSE BLD-MCNC: 131 MG/DL (ref 70–99)
GLUCOSE BLD-MCNC: 132 MG/DL (ref 70–99)
PHOSPHORUS: 2.9 MG/DL (ref 2.5–4.9)
POTASSIUM REFLEX MAGNESIUM: 4.2 MMOL/L (ref 3.5–5.1)
POTASSIUM SERPL-SCNC: 4.2 MMOL/L (ref 3.5–5.1)
SODIUM BLD-SCNC: 137 MMOL/L (ref 136–145)
SODIUM BLD-SCNC: 137 MMOL/L (ref 136–145)

## 2022-10-01 PROCEDURE — 6360000002 HC RX W HCPCS: Performed by: INTERNAL MEDICINE

## 2022-10-01 PROCEDURE — 2580000003 HC RX 258: Performed by: INTERNAL MEDICINE

## 2022-10-01 PROCEDURE — 36415 COLL VENOUS BLD VENIPUNCTURE: CPT

## 2022-10-01 PROCEDURE — 6370000000 HC RX 637 (ALT 250 FOR IP): Performed by: INTERNAL MEDICINE

## 2022-10-01 PROCEDURE — 80069 RENAL FUNCTION PANEL: CPT

## 2022-10-01 PROCEDURE — 1200000000 HC SEMI PRIVATE

## 2022-10-01 PROCEDURE — 6370000000 HC RX 637 (ALT 250 FOR IP): Performed by: NURSE PRACTITIONER

## 2022-10-01 RX ORDER — HEPARIN SODIUM 5000 [USP'U]/ML
5000 INJECTION, SOLUTION INTRAVENOUS; SUBCUTANEOUS EVERY 8 HOURS SCHEDULED
Status: DISCONTINUED | OUTPATIENT
Start: 2022-10-01 | End: 2022-10-08 | Stop reason: HOSPADM

## 2022-10-01 RX ORDER — ASPIRIN 81 MG/1
81 TABLET ORAL DAILY
Status: DISCONTINUED | OUTPATIENT
Start: 2022-10-01 | End: 2022-10-08 | Stop reason: HOSPADM

## 2022-10-01 RX ORDER — TRAZODONE HYDROCHLORIDE 50 MG/1
50 TABLET ORAL NIGHTLY
Status: DISCONTINUED | OUTPATIENT
Start: 2022-10-01 | End: 2022-10-05

## 2022-10-01 RX ORDER — LANOLIN ALCOHOL/MO/W.PET/CERES
6 CREAM (GRAM) TOPICAL NIGHTLY PRN
Status: DISCONTINUED | OUTPATIENT
Start: 2022-10-01 | End: 2022-10-08 | Stop reason: HOSPADM

## 2022-10-01 RX ORDER — QUETIAPINE FUMARATE 25 MG/1
50 TABLET, FILM COATED ORAL DAILY
Status: DISCONTINUED | OUTPATIENT
Start: 2022-10-01 | End: 2022-10-06

## 2022-10-01 RX ORDER — SODIUM CHLORIDE 9 MG/ML
INJECTION, SOLUTION INTRAVENOUS CONTINUOUS
Status: DISCONTINUED | OUTPATIENT
Start: 2022-10-01 | End: 2022-10-02

## 2022-10-01 RX ADMIN — Medication 6 MG: at 02:01

## 2022-10-01 RX ADMIN — HEPARIN SODIUM 5000 UNITS: 5000 INJECTION INTRAVENOUS; SUBCUTANEOUS at 14:39

## 2022-10-01 RX ADMIN — MEDROXYPROGESTERONE ACETATE 10 MG: 10 TABLET ORAL at 09:21

## 2022-10-01 RX ADMIN — CIMETIDINE HYDROCHLORIDE ORAL SOLUTION 800 MG: 300 SOLUTION ORAL at 09:22

## 2022-10-01 RX ADMIN — MEMANTINE HYDROCHLORIDE 10 MG: 10 TABLET ORAL at 09:21

## 2022-10-01 RX ADMIN — ASPIRIN 81 MG: 81 TABLET, COATED ORAL at 12:40

## 2022-10-01 RX ADMIN — HEPARIN SODIUM 5000 UNITS: 5000 INJECTION INTRAVENOUS; SUBCUTANEOUS at 22:07

## 2022-10-01 RX ADMIN — SODIUM CHLORIDE, POTASSIUM CHLORIDE, SODIUM LACTATE AND CALCIUM CHLORIDE 1000 ML: 600; 310; 30; 20 INJECTION, SOLUTION INTRAVENOUS at 09:30

## 2022-10-01 RX ADMIN — CEFTRIAXONE 1000 MG: 1 INJECTION, POWDER, FOR SOLUTION INTRAMUSCULAR; INTRAVENOUS at 01:37

## 2022-10-01 RX ADMIN — SODIUM CHLORIDE: 9 INJECTION, SOLUTION INTRAVENOUS at 12:42

## 2022-10-01 RX ADMIN — QUETIAPINE FUMARATE 50 MG: 25 TABLET ORAL at 12:41

## 2022-10-01 ASSESSMENT — PAIN SCALES - PAIN ASSESSMENT IN ADVANCED DEMENTIA (PAINAD)
NEGVOCALIZATION: 0
TOTALSCORE: 2
TOTALSCORE: 2
BODYLANGUAGE: 1
TOTALSCORE: 2
CONSOLABILITY: 1
TOTALSCORE: 2
NEGVOCALIZATION: 0
NEGVOCALIZATION: 0
BODYLANGUAGE: 1
FACIALEXPRESSION: 0
TOTALSCORE: 2
BODYLANGUAGE: 1
TOTALSCORE: 0
CONSOLABILITY: 1
FACIALEXPRESSION: 0
FACIALEXPRESSION: 0
CONSOLABILITY: 1
TOTALSCORE: 2
TOTALSCORE: 2
FACIALEXPRESSION: 0
NEGVOCALIZATION: 0
BODYLANGUAGE: 1
CONSOLABILITY: 0
CONSOLABILITY: 1
BODYLANGUAGE: 0
NEGVOCALIZATION: 0
FACIALEXPRESSION: 0
BREATHING: 0
CONSOLABILITY: 1
FACIALEXPRESSION: 0
BODYLANGUAGE: 1
NEGVOCALIZATION: 0
BREATHING: 0
NEGVOCALIZATION: 0
NEGVOCALIZATION: 0
BODYLANGUAGE: 1
BREATHING: 0
BREATHING: 0
CONSOLABILITY: 1
BREATHING: 0
BODYLANGUAGE: 1
BREATHING: 0
FACIALEXPRESSION: 0
CONSOLABILITY: 1
BREATHING: 0
BREATHING: 0
FACIALEXPRESSION: 0

## 2022-10-01 NOTE — PLAN OF CARE
Problem: Discharge Planning  Goal: Discharge to home or other facility with appropriate resources  Note: Family has many concerns about patient returning to current facility. They spoke to case management, referral made to new facility. Problem: Pain  Goal: Verbalizes/displays adequate comfort level or baseline comfort level  Note: No s/s of pain. Problem: Chronic Conditions and Co-morbidities  Goal: Patient's chronic conditions and co-morbidity symptoms are monitored and maintained or improved  Note: Patient has dementia. He is often trying to climb out of bed. Camera in room. He is very confused. Problem: Skin/Tissue Integrity  Goal: Absence of new skin breakdown  Description: 1. Monitor for areas of redness and/or skin breakdown  2. Assess vascular access sites hourly  3. Every 4-6 hours minimum:  Change oxygen saturation probe site  4. Every 4-6 hours:  If on nasal continuous positive airway pressure, respiratory therapy assess nares and determine need for appliance change or resting period. Note: Area under abdominal panus, red, moist, and open. Anti fungal cream applied. Sacral area red and tender. He is incontinent of urine. Venelex applied. Foam dressing in place. Heels elevated. He repositions frequently on his own.

## 2022-10-01 NOTE — PROGRESS NOTES
Pt was awake throughout the night, trying to pull out his iv line and  trying to jump out from the bed. Doctor notified for prescription of sleep pill, he did prescribe melatonin 6mg. It was given but no effectiveness was seen, pt still awake even after taken the melatonin pill.will continue to monitor.

## 2022-10-01 NOTE — CARE COORDINATION
CM met with the pt and his family at the request of his daughter/PARI, Charissa Wise. The family is from [place] and would prefer a nursing facility closer to their home. MrMary Vallejo has a history of dementia with behavioral disturbances. He has been hospitalized in psychiatric facilities multiple times in the last few months for medication management of behaviors. Of most concern has been acting out out sexually. Darrian Haile has reached out to many facilities attempting to find alternative placement for her father. She requested a referral to [facility]. CM agreed to make the referral. Explained if he is not accepted, it cannot delay the discharge. Also, explained Medicare will not cover the full cost of  transport to [address] [place] [address]. There would be an out of pocket cost to pay upfront. Darrian Haile verbalized understanding. CM faxed a packet to Armin in admissions at [facility] [phone]. Iveth's direct phone number is [phone]. Attempted to leave a message but the mailbox is full. Per Rosalina Ortiz stated the referral would be reviewed on Monday and a decision made as soon as possible.     [facility]  Address: [address], [address]      Electronically signed by MALKA Martínez RN-[facility] ACM-RN  Case Management  [phone]

## 2022-10-01 NOTE — CONSULTS
Pharmacy Consult Note  - Admission Medication Reconciliation      Pharmacy consulted for reconciliation of [id number] medications. I reviewed med list faxed from [facility]      The following changes made to [id number] medication list:      Dose or Frequency CHANGE:  1) Lorazepam - on NH med list 2 times:  1mg q6h scheduled, and 1mg q6h PRN           Current Outpatient Medications   Medication Instructions    aspirin EC 81 mg, Oral, DAILY    carvedilol (COREG) 12.5 mg, Oral, 2 TIMES DAILY WITH MEALS, Hold for SBP <110 OR HR <60    CIMETIDINE 200  mg, Oral, 2 times daily    donepezil (ARICEPT) 10 mg, Oral, NIGHTLY    ergocalciferol (ERGOCALCIFEROL) 50,000 Units, Oral, WEEKLY    FLUoxetine (PROZAC) 20 mg, Oral, DAILY    furosemide (LASIX) 40 mg, Oral, DAILY    LORazepam (ATIVAN) 1 mg, Oral, EVERY 6 HOURS PRN    medroxyPROGESTERone (PROVERA) 10 mg, Oral, 2 times daily    memantine (NAMENDA) 10 mg, Oral, 2 TIMES DAILY    montelukast (SINGULAIR) 10 mg, Oral, DAILY    potassium chloride (MICRO-K) 10 MEQ extended release capsule 10 mEq, Oral, DAILY    QUEtiapine (SEROQUEL) 150 mg, Oral, NIGHTLY    QUEtiapine (SEROQUEL) 50 mg, Oral, DAILY    traZODone (DESYREL) 50 mg, Oral, NIGHTLY       Thank you for the consult    Please call with any questions:    Maryse Noland  PharmMary D[facility]  [phone]

## 2022-10-01 NOTE — PROGRESS NOTES
Hospitalist Progress Note      PCP: Katie Fried DO    Date of Admission: 9/29/2022    Chief Complaint: Wandering at SNF, found in another resident's room wedged between the bed and the wall. Subjective:    Unable to sleep, family visiting this morning. No distress. Severe dementia at baseline. Medications:  Reviewed    Infusion Medications    lactated ringers 1,000 mL (10/01/22 0930)    sodium chloride       Scheduled Medications    memantine  10 mg Oral BID    donepezil  10 mg Oral Nightly    sodium chloride flush  5-40 mL IntraVENous 2 times per day    cefTRIAXone (ROCEPHIN) IV  1,000 mg IntraVENous Q24H    medroxyPROGESTERone  10 mg Oral BID    cimetidine  800 mg Oral BID    Venelex   Topical BID     PRN Meds: melatonin, sodium chloride flush, sodium chloride, ondansetron **OR** ondansetron, polyethylene glycol, acetaminophen **OR** acetaminophen      Intake/Output Summary (Last 24 hours) at 10/1/2022 0942  Last data filed at 9/30/2022 1821  Gross per 24 hour   Intake 320 ml   Output --   Net 320 ml       Exam:    /81   Pulse (!) 13   Temp 99 °F (37.2 °C) (Axillary)   Resp 16   Wt 197 lb 8 oz (89.6 kg)   SpO2 92%     General appearance: No apparent distress, appears stated age and cooperative. HEENT: Pupils equal, round, and reactive to light. Conjunctivae/corneas clear. Neck: Supple, with full range of motion. No jugular venous distention. Trachea midline. Respiratory:  Normal respiratory effort. Clear to auscultation, bilaterally without Rales/Wheezes/Rhonchi. Cardiovascular: Regular rate and rhythm with normal S1/S2 without murmurs, rubs or gallops. Abdomen: Soft, non-tender, non-distended with normal bowel sounds. Musculoskelatal: No clubbing, cyanosis or edema bilaterally. Skin: Skin color, texture, turgor normal.  No rashes or lesions.   Neurologic:  Cranial nerves: II-XII intact, grossly non-focal.  Psychiatric: Alert and oriented, thought content appropriate, normal insight    Labs:   Recent Labs     09/29/22 2339   WBC 12.5*   HGB 14.1   HCT 42.9        Recent Labs     09/29/22 2339      K 4.5      CO2 22   BUN 23*   CREATININE 2.5*   CALCIUM 9.7     No results for input(s): AST, ALT, BILIDIR, BILITOT, ALKPHOS in the last 72 hours. No results for input(s): INR in the last 72 hours. Recent Labs     09/29/22 2339 09/30/22  0036   TROPONINI 0.03* 0.02*       Studies:  XR CHEST PORTABLE   Final Result      No acute pulmonary disease. CT CSpine W/O Contrast   Final Result      1. Chronic bilateral cerebral convexity subdural hematomas. No midline shift. 2.  No acute intracranial abnormality. 3.  No evidence of acute fracture in the cervical spine. 4.  Large anterior osteophytes at C2-C5 level. Note that this is often incidental and asymptomatic but can contribute to dysphagia. CT Head W/O Contrast   Final Result      1. Chronic bilateral cerebral convexity subdural hematomas. No midline shift. 2.  No acute intracranial abnormality. 3.  No evidence of acute fracture in the cervical spine. 4.  Large anterior osteophytes at C2-C5 level. Note that this is often incidental and asymptomatic but can contribute to dysphagia.                 Assessment/Plan:    Active Hospital Problems    Diagnosis Date Noted    YANY (acute kidney injury) ([facility] [address] [N17.9] 09/30/2022     Priority: Medium       Suspected fall  Chronic b/l SDH  Neurosurgery c/s, appreciate recs  Ok to resume home ASA    YANY  Bladder scans  IV fluids  Obtain follow-up lab    Suspected UTI  Continue Rocephin  Follow-up urine Cx    Elevated troponin, likely due to demand    Atrial fibrillation  Not on A/c per notes due to bleeding risk and recurrent falls    Sever Dementia with   SLP abhilash  Went through med/list at [facility] however unsure if entirely accurate, does not have Tagamet or Provera which 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

## 2022-10-02 LAB
ALBUMIN SERPL-MCNC: 3.7 G/DL (ref 3.4–5)
ANION GAP SERPL CALCULATED.3IONS-SCNC: 10 MMOL/L (ref 3–16)
BUN BLDV-MCNC: 10 MG/DL (ref 7–20)
CALCIUM SERPL-MCNC: 8.3 MG/DL (ref 8.3–10.6)
CHLORIDE BLD-SCNC: 104 MMOL/L (ref 99–110)
CO2: 25 MMOL/L (ref 21–32)
CREAT SERPL-MCNC: 0.9 MG/DL (ref 0.8–1.3)
GFR AFRICAN AMERICAN: >60
GFR NON-AFRICAN AMERICAN: >60
GLUCOSE BLD-MCNC: 98 MG/DL (ref 70–99)
PHOSPHORUS: 3.2 MG/DL (ref 2.5–4.9)
POTASSIUM SERPL-SCNC: 4 MMOL/L (ref 3.5–5.1)
SODIUM BLD-SCNC: 139 MMOL/L (ref 136–145)

## 2022-10-02 PROCEDURE — 6360000002 HC RX W HCPCS: Performed by: INTERNAL MEDICINE

## 2022-10-02 PROCEDURE — 6370000000 HC RX 637 (ALT 250 FOR IP): Performed by: NURSE PRACTITIONER

## 2022-10-02 PROCEDURE — 6370000000 HC RX 637 (ALT 250 FOR IP): Performed by: INTERNAL MEDICINE

## 2022-10-02 PROCEDURE — 80069 RENAL FUNCTION PANEL: CPT

## 2022-10-02 PROCEDURE — 2580000003 HC RX 258: Performed by: INTERNAL MEDICINE

## 2022-10-02 PROCEDURE — 36415 COLL VENOUS BLD VENIPUNCTURE: CPT

## 2022-10-02 PROCEDURE — 1200000000 HC SEMI PRIVATE

## 2022-10-02 RX ORDER — LORAZEPAM 1 MG/1
1 TABLET ORAL EVERY 6 HOURS PRN
Status: DISCONTINUED | OUTPATIENT
Start: 2022-10-02 | End: 2022-10-08 | Stop reason: HOSPADM

## 2022-10-02 RX ADMIN — HEPARIN SODIUM 5000 UNITS: 5000 INJECTION INTRAVENOUS; SUBCUTANEOUS at 14:22

## 2022-10-02 RX ADMIN — QUETIAPINE FUMARATE 150 MG: 100 TABLET ORAL at 20:35

## 2022-10-02 RX ADMIN — DONEPEZIL HYDROCHLORIDE 10 MG: 10 TABLET, FILM COATED ORAL at 20:35

## 2022-10-02 RX ADMIN — HEPARIN SODIUM 5000 UNITS: 5000 INJECTION INTRAVENOUS; SUBCUTANEOUS at 05:52

## 2022-10-02 RX ADMIN — CEFTRIAXONE 1000 MG: 1 INJECTION, POWDER, FOR SOLUTION INTRAMUSCULAR; INTRAVENOUS at 01:48

## 2022-10-02 RX ADMIN — TRAZODONE HYDROCHLORIDE 50 MG: 50 TABLET, FILM COATED ORAL at 20:35

## 2022-10-02 RX ADMIN — SODIUM CHLORIDE, PRESERVATIVE FREE 10 ML: 5 INJECTION INTRAVENOUS at 09:37

## 2022-10-02 RX ADMIN — Medication: at 20:36

## 2022-10-02 RX ADMIN — MEDROXYPROGESTERONE ACETATE 10 MG: 10 TABLET ORAL at 09:33

## 2022-10-02 RX ADMIN — MEMANTINE HYDROCHLORIDE 10 MG: 10 TABLET ORAL at 20:35

## 2022-10-02 RX ADMIN — CIMETIDINE HYDROCHLORIDE ORAL SOLUTION 800 MG: 300 SOLUTION ORAL at 09:33

## 2022-10-02 RX ADMIN — HEPARIN SODIUM 5000 UNITS: 5000 INJECTION INTRAVENOUS; SUBCUTANEOUS at 20:41

## 2022-10-02 RX ADMIN — QUETIAPINE FUMARATE 50 MG: 25 TABLET ORAL at 09:33

## 2022-10-02 RX ADMIN — CIMETIDINE HYDROCHLORIDE ORAL SOLUTION 800 MG: 300 SOLUTION ORAL at 20:40

## 2022-10-02 RX ADMIN — ASPIRIN 81 MG: 81 TABLET, COATED ORAL at 09:33

## 2022-10-02 RX ADMIN — Medication: at 09:36

## 2022-10-02 RX ADMIN — MEMANTINE HYDROCHLORIDE 10 MG: 10 TABLET ORAL at 09:33

## 2022-10-02 ASSESSMENT — PAIN SCALES - PAIN ASSESSMENT IN ADVANCED DEMENTIA (PAINAD)
NEGVOCALIZATION: 0
BODYLANGUAGE: 0
NEGVOCALIZATION: 0
CONSOLABILITY: 1
BODYLANGUAGE: 1
FACIALEXPRESSION: 0
TOTALSCORE: 2
BREATHING: 0
TOTALSCORE: 0
TOTALSCORE: 2
CONSOLABILITY: 0
FACIALEXPRESSION: 0
TOTALSCORE: 2
TOTALSCORE: 2
BREATHING: 0
CONSOLABILITY: 1
NEGVOCALIZATION: 0
BREATHING: 0
CONSOLABILITY: 1
CONSOLABILITY: 1
BODYLANGUAGE: 1
FACIALEXPRESSION: 0
NEGVOCALIZATION: 0
BODYLANGUAGE: 1
FACIALEXPRESSION: 0
BREATHING: 0
NEGVOCALIZATION: 0
NEGVOCALIZATION: 0
FACIALEXPRESSION: 0
BODYLANGUAGE: 1
FACIALEXPRESSION: 0
CONSOLABILITY: 1
BREATHING: 0
TOTALSCORE: 2
BODYLANGUAGE: 1
BREATHING: 0

## 2022-10-02 ASSESSMENT — PAIN SCALES - WONG BAKER
WONGBAKER_NUMERICALRESPONSE: 2
WONGBAKER_NUMERICALRESPONSE: 0

## 2022-10-02 NOTE — PROGRESS NOTES
Hospitalist Progress Note      PCP: Peterson Mays DO    Date of Admission: 9/29/2022    Chief Complaint: Wandering at SNF, found in another resident's room wedged between the bed and the wall. Subjective:    Pt is in restraints. No distress at this time. Medications:  Reviewed    Infusion Medications    sodium chloride 100 mL/hr at 10/01/22 1811    sodium chloride       Scheduled Medications    QUEtiapine  150 mg Oral Nightly    QUEtiapine  50 mg Oral Daily    aspirin EC  81 mg Oral Daily    traZODone  50 mg Oral Nightly    heparin (porcine)  5,000 Units SubCUTAneous 3 times per day    memantine  10 mg Oral BID    donepezil  10 mg Oral Nightly    sodium chloride flush  5-40 mL IntraVENous 2 times per day    cefTRIAXone (ROCEPHIN) IV  1,000 mg IntraVENous Q24H    medroxyPROGESTERone  10 mg Oral BID    cimetidine  800 mg Oral BID    Venelex   Topical BID     PRN Meds: melatonin, sodium chloride flush, sodium chloride, ondansetron **OR** ondansetron, polyethylene glycol, acetaminophen **OR** acetaminophen      Intake/Output Summary (Last 24 hours) at 10/2/2022 1058  Last data filed at 10/2/2022 0600  Gross per 24 hour   Intake 3226.88 ml   Output 0 ml   Net 3226.88 ml         Exam:    BP (!) 147/102   Pulse (!) 106   Temp 97.3 °F (36.3 °C) (Axillary)   Resp 14   Wt 205 lb 11 oz (93.3 kg)   SpO2 97%     General appearance: No apparent distress, appears stated age and cooperative. HEENT: Pupils equal, round, and reactive to light. Conjunctivae/corneas clear. Neck: Supple, with full range of motion. No jugular venous distention. Trachea midline. Respiratory:  Normal respiratory effort. Clear to auscultation, bilaterally without Rales/Wheezes/Rhonchi. Cardiovascular: Regular rate and rhythm with normal S1/S2 without murmurs, rubs or gallops. Abdomen: Soft, non-tender, non-distended with normal bowel sounds. Musculoskelatal: No clubbing, cyanosis or edema bilaterally.     Skin: Skin color, texture, turgor normal.  No rashes or lesions. Neurologic:  Cranial nerves: II-XII intact, grossly non-focal.  Psychiatric: Alert, nonverbal. No distress at present    Labs:   Recent Labs     09/29/22 2339   WBC 12.5*   HGB 14.1   HCT 42.9          Recent Labs     09/29/22  2339 10/01/22  1002 10/02/22  0832    137  137 139   K 4.5 4.2  4.2 4.0    105  105 104   CO2 22 17*  17* 25   BUN 23* 15  16 10   CREATININE 2.5* 1.0  1.0 0.9   CALCIUM 9.7 8.5  8.5 8.3   PHOS  --  2.9 3.2       No results for input(s): AST, ALT, BILIDIR, BILITOT, ALKPHOS in the last 72 hours. No results for input(s): INR in the last 72 hours. Recent Labs     09/29/22 2339 09/30/22  0036   TROPONINI 0.03* 0.02*         Studies:  XR CHEST PORTABLE   Final Result      No acute pulmonary disease. CT CSpine W/O Contrast   Final Result      1. Chronic bilateral cerebral convexity subdural hematomas. No midline shift. 2.  No acute intracranial abnormality. 3.  No evidence of acute fracture in the cervical spine. 4.  Large anterior osteophytes at C2-C5 level. Note that this is often incidental and asymptomatic but can contribute to dysphagia. CT Head W/O Contrast   Final Result      1. Chronic bilateral cerebral convexity subdural hematomas. No midline shift. 2.  No acute intracranial abnormality. 3.  No evidence of acute fracture in the cervical spine. 4.  Large anterior osteophytes at C2-C5 level. Note that this is often incidental and asymptomatic but can contribute to dysphagia. Assessment/Plan:    Active Hospital Problems    Diagnosis Date Noted    YANY (acute kidney injury) ([facility] [address] [N17.9] 09/30/2022     Priority: Medium     Suspected fall  Chronic b/l SDH  Neurosurgery c/s, appreciate recs  Ok to resume home ASA    YANY - resolved  Bladder scans  Treated with IV fluids    Suspected UTI  Continue Rocephin  Follow-up urine Cx.  Sensitivity for this organism is a send-out if requested. 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 -Psychiatry consulted to help assess placement  -Will try to get out of restraints today    DVT Prophylaxis: Heparin  Diet: ADULT DIET;  Regular  Code Status: Full Code    PT/OT Eval Status: pending    Dispo - Inpatient    Vangie Garcia DO

## 2022-10-02 NOTE — PLAN OF CARE
Problem: Pain  Goal: Verbalizes/displays adequate comfort level or baseline comfort level  10/2/2022 0221 by Yarely Canela RN  Outcome: Not Progressing  Note: Pt is non verbal and unable to express needs to staff. Pt speaks occasionally. Pt does not call out to staff, resists help from staff. Will continue to closely monitor. Problem: Confusion  Goal: Confusion, delirium, dementia, or psychosis is improved or at baseline  Description: INTERVENTIONS:  1. Assess for possible contributors to thought disturbance, including medications, impaired vision or hearing, underlying metabolic abnormalities, dehydration, psychiatric diagnoses, and notify attending LIP  2. [place] high risk fall precautions, as indicated  3. Provide frequent short contacts to provide reality reorientation, refocusing and direction  4. Decrease environmental stimuli, including noise as appropriate  5. Monitor and intervene to maintain adequate nutrition, hydration, elimination, sleep and activity  6. If unable to ensure safety without constant attention obtain sitter and review sitter guidelines with assigned personnel  7. Initiate Psychosocial CNS and Spiritual Care consult, as indicated  Outcome: Progressing  Note: Pt is nonverbal with a history of dementia at baseline. Problem: Skin/Tissue Integrity  Goal: Absence of new skin breakdown  Description: 1. Monitor for areas of redness and/or skin breakdown  2. Assess vascular access sites hourly  3. Every 4-6 hours minimum:  Change oxygen saturation probe site  4. Every 4-6 hours:  If on nasal continuous positive airway pressure, respiratory therapy assess nares and determine need for appliance change or resting period. 10/2/2022 0221 by Yarely Canela RN  Outcome: Adequate for Discharge  Note: Pt has remained free from new skin breakdown during shift.      Problem: Safety - Medical Restraint  Goal: Remains free of injury from restraints (Restraint for Interference with Medical Device)  Description: INTERVENTIONS:  1. Determine that other, less restrictive measures have been tried or would not be effective before applying the restraint  2. Evaluate the patient's condition at the time of restraint application  3. Inform patient/family regarding the reason for restraint  4. Q2H: Monitor safety, psychosocial status, comfort, nutrition and hydration  Outcome: Adequate for Discharge  Flowsheets  Taken 10/2/2022 0200  Remains free of injury from restraints (restraint for interference with medical device): Determine that other, less restrictive measures have been tried or would not be effective before applying the restraint  Taken 10/2/2022 0000  Remains free of injury from restraints (restraint for interference with medical device): Determine that other, less restrictive measures have been tried or would not be effective before applying the restraint  Taken 10/1/2022 2216  Remains free of injury from restraints (restraint for interference with medical device): Determine that other, less restrictive measures have been tried or would not be effective before applying the restraint  Note: Pt in bilateral wrist restraints at this time. Pt has remained free from restraint related injury during shift. Will continue to closely monitor.

## 2022-10-02 NOTE — PLAN OF CARE
Problem: Safety - Medical Restraint  Goal: Remains free of injury from restraints (Restraint for Interference with Medical Device)  Description: INTERVENTIONS:  1. Determine that other, less restrictive measures have been tried or would not be effective before applying the restraint  2. Evaluate the patient's condition at the time of restraint application  3. Inform patient/family regarding the reason for restraint  4.  Q2H: Monitor safety, psychosocial status, comfort, nutrition and hydration  10/2/2022 1331 by Dangelo Sol RN  Outcome: Progressing  Flowsheets  Taken 10/2/2022 0942 by Dangelo Sol RN  Remains free of injury from restraints (restraint for interference with medical device): Determine that other, less restrictive measures have been tried or would not be effective before applying the restraint  Taken 10/2/2022 0600 by Symone Barrera RN  Remains free of injury from restraints (restraint for interference with medical device): Determine that other, less restrictive measures have been tried or would not be effective before applying the restraint  Taken 10/2/2022 0400 by Symone Barrera RN  Remains free of injury from restraints (restraint for interference with medical device): Determine that other, less restrictive measures have been tried or would not be effective before applying the restraint  10/2/2022 0221 by Symone Barrera RN  Outcome: Adequate for Discharge  Flowsheets  Taken 10/2/2022 0200  Remains free of injury from restraints (restraint for interference with medical device): Determine that other, less restrictive measures have been tried or would not be effective before applying the restraint  Taken 10/2/2022 0000  Remains free of injury from restraints (restraint for interference with medical device): Determine that other, less restrictive measures have been tried or would not be effective before applying the restraint  Taken 10/1/2022 2216  Remains free of injury from restraints (restraint for interference with medical device): Determine that other, less restrictive measures have been tried or would not be effective before applying the restraint  Note: Pt in bilateral wrist restraints at this time. Pt has remained free from restraint related injury during shift. Will continue to closely monitor. Problem: Pain  Goal: Verbalizes/displays adequate comfort level or baseline comfort level  10/2/2022 0221 by Hussein Tyson RN  Outcome: Not Progressing  Note: Pt is non verbal and unable to express needs to staff. Pt speaks occasionally. Pt does not call out to staff, resists help from staff. Will continue to closely monitor.

## 2022-10-03 LAB
ALBUMIN SERPL-MCNC: 3.5 G/DL (ref 3.4–5)
ANION GAP SERPL CALCULATED.3IONS-SCNC: 12 MMOL/L (ref 3–16)
BUN BLDV-MCNC: 7 MG/DL (ref 7–20)
CALCIUM SERPL-MCNC: 8.7 MG/DL (ref 8.3–10.6)
CHLORIDE BLD-SCNC: 106 MMOL/L (ref 99–110)
CO2: 21 MMOL/L (ref 21–32)
CREAT SERPL-MCNC: 0.8 MG/DL (ref 0.8–1.3)
GFR AFRICAN AMERICAN: >60
GFR NON-AFRICAN AMERICAN: >60
GLUCOSE BLD-MCNC: 141 MG/DL (ref 70–99)
PHOSPHORUS: 2.7 MG/DL (ref 2.5–4.9)
POTASSIUM SERPL-SCNC: 4.2 MMOL/L (ref 3.5–5.1)
SODIUM BLD-SCNC: 139 MMOL/L (ref 136–145)

## 2022-10-03 PROCEDURE — 6370000000 HC RX 637 (ALT 250 FOR IP): Performed by: INTERNAL MEDICINE

## 2022-10-03 PROCEDURE — 2580000003 HC RX 258: Performed by: INTERNAL MEDICINE

## 2022-10-03 PROCEDURE — 6370000000 HC RX 637 (ALT 250 FOR IP): Performed by: NURSE PRACTITIONER

## 2022-10-03 PROCEDURE — 80069 RENAL FUNCTION PANEL: CPT

## 2022-10-03 PROCEDURE — 90792 PSYCH DIAG EVAL W/MED SRVCS: CPT | Performed by: NURSE PRACTITIONER

## 2022-10-03 PROCEDURE — 6360000002 HC RX W HCPCS: Performed by: INTERNAL MEDICINE

## 2022-10-03 PROCEDURE — 36415 COLL VENOUS BLD VENIPUNCTURE: CPT

## 2022-10-03 PROCEDURE — 1200000000 HC SEMI PRIVATE

## 2022-10-03 RX ADMIN — HEPARIN SODIUM 5000 UNITS: 5000 INJECTION INTRAVENOUS; SUBCUTANEOUS at 14:30

## 2022-10-03 RX ADMIN — Medication: at 10:15

## 2022-10-03 RX ADMIN — SODIUM CHLORIDE, PRESERVATIVE FREE 10 ML: 5 INJECTION INTRAVENOUS at 10:16

## 2022-10-03 RX ADMIN — TRAZODONE HYDROCHLORIDE 50 MG: 50 TABLET, FILM COATED ORAL at 19:39

## 2022-10-03 RX ADMIN — SODIUM CHLORIDE, PRESERVATIVE FREE 10 ML: 5 INJECTION INTRAVENOUS at 19:41

## 2022-10-03 RX ADMIN — QUETIAPINE FUMARATE 150 MG: 100 TABLET ORAL at 19:39

## 2022-10-03 RX ADMIN — CEFTRIAXONE 1000 MG: 1 INJECTION, POWDER, FOR SOLUTION INTRAMUSCULAR; INTRAVENOUS at 03:00

## 2022-10-03 RX ADMIN — HEPARIN SODIUM 5000 UNITS: 5000 INJECTION INTRAVENOUS; SUBCUTANEOUS at 06:57

## 2022-10-03 RX ADMIN — CIMETIDINE HYDROCHLORIDE ORAL SOLUTION 800 MG: 300 SOLUTION ORAL at 19:42

## 2022-10-03 RX ADMIN — MEMANTINE HYDROCHLORIDE 10 MG: 10 TABLET ORAL at 19:39

## 2022-10-03 RX ADMIN — DONEPEZIL HYDROCHLORIDE 10 MG: 10 TABLET, FILM COATED ORAL at 19:39

## 2022-10-03 RX ADMIN — Medication: at 19:41

## 2022-10-03 RX ADMIN — MEDROXYPROGESTERONE ACETATE 10 MG: 10 TABLET ORAL at 19:42

## 2022-10-03 ASSESSMENT — PAIN SCALES - WONG BAKER
WONGBAKER_NUMERICALRESPONSE: 0
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
## 2022-10-03 NOTE — PROGRESS NOTES
HOSPITAL MEDICINE  - PROGRESS NOTE    Admit Date: 9/29/2022         Interval History:  80 y.o. male with dementia and recurrent falls  -presents from his SNF for suspected fall as the nursing staff has noticed some bruising on his face   - first day at this facility. - has history of wandering around and he was found in another resident's room wedged between the bed and the wall with possible bruise to his face. Subjective:  sleepy,arouses easily. Daughter at bedside  Discussed diff with various facilities sec to agitation/aggressive behavior. Looking at  SNF? ? Objective:  afebrile. stable vitals    Diet: ADULT DIET; Regular       Data:   Scheduled Meds: Reviewed  Continuous Infusions:   sodium chloride       No intake or output data in the 24 hours ending 10/03/22 1005  CBC: No results for input(s): WBC, HGB, PLT in the last 72 hours. BMP:  Recent Labs     10/02/22  0832      K 4.0      CO2 25   BUN 10   CREATININE 0.9   GLUCOSE 98        Objective:   Vitals: /83   Pulse 80   Temp 97.7 °F (36.5 °C) (Axillary)   Resp 17   Wt 205 lb 11 oz (93.3 kg)   SpO2 93%   General appearance: alert, appears stated age and cooperative  Skin: Skin color, texture, turgor normal.   HEENT: Head: Normocephalic, no lesions, without obvious abnormality.   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 Continue Rocephin  Follow-up urine Cx.    -Requested sensitivities to aerococcus>100kCFU     Elevated troponin, likely due to demand  -on ASA,statin     Atrial fibrillation  Not on A/c per notes due to bleeding risk and recurrent falls     Severe Dementia   -on donepezil and namenda  -on seroquel and trazodone for agitation  -Psychiatry consulted          DVT Prophylaxis: Heparin  Diet: ADULT DIET; Regular  Code Status: Full Code     PT/OT Eval Status: pending    Disposition:?DC TO DEMENTIA UNIT TODAY-LTC at [facility]    Discussed with daughter at bedside who says she is looking at a SNF close to her home.     Mellissa Landa MD

## 2022-10-03 NOTE — CARE COORDINATION
NANO  called  Jo Rivera to follow up on referral ;    Left Vm  requesting  call back  with up date . Electronically signed by Katharine Grey RN on 10/3/2022 at 4:23 PM      +++++++++++++++++++++++++++++++++++++++++++++++      CM  following for  d/c planning:    Patient  from  [address] [place] [address]  at [facility]  :    And  family is requesting  assistance w/  transferring tp to  a facility closer  to them  :    NANO faxed a packet to Jo Rivera in admissions at [facility] [phone].  -   Iveth's direct phone number is [phone].  Marita English stated the referral would be reviewed on Monday and a decision made as soon as possible    Specifically     CM  spoke with Jo Rivera   this  am  she  was  needing clarification  on a few  medications and  / behaviorals  or  past  admissions  for Her  DON <    She  plans to reach out and talk to the  dgtr  Elizabeth Yanez and  will call CM  back with a  determination . [facility]  Address: [address] [place] [address]    Electronically signed by Katharine Grey RN on 10/3/2022 at 10:33 AM        Katharine Grey RN Case Manager  Skyler [address]  [address]   [facility] [id number] [phone]  Fax [phone]

## 2022-10-03 NOTE — PLAN OF CARE
Problem: Safety - Medical Restraint  Goal: Remains free of injury from restraints (Restraint for Interference with Medical Device)  Description: INTERVENTIONS:  1. Determine that other, less restrictive measures have been tried or would not be effective before applying the restraint  2. Evaluate the patient's condition at the time of restraint application  3. Inform patient/family regarding the reason for restraint  4. Q2H: Monitor safety, psychosocial status, comfort, nutrition and hydration  Outcome: Not Progressing   Patient still requiring restraints. Patient pulling at lines and attempting to get out of bed without assistance. Sitter at bedside   Problem: Confusion  Goal: Confusion, delirium, dementia, or psychosis is improved or at baseline  Description: INTERVENTIONS:  1. Assess for possible contributors to thought disturbance, including medications, impaired vision or hearing, underlying metabolic abnormalities, dehydration, psychiatric diagnoses, and notify attending LIP  2. [place] high risk fall precautions, as indicated  3. Provide frequent short contacts to provide reality reorientation, refocusing and direction  4. Decrease environmental stimuli, including noise as appropriate  5. Monitor and intervene to maintain adequate nutrition, hydration, elimination, sleep and activity  6. If unable to ensure safety without constant attention obtain sitter and review sitter guidelines with assigned personnel  7. Initiate Psychosocial CNS and Spiritual Care consult, as indicated  Outcome: Not Progressing   Patient not able to communicate time, place, situation, self.  Will continue to monitor

## 2022-10-04 LAB
ALBUMIN SERPL-MCNC: 3.5 G/DL (ref 3.4–5)
ANION GAP SERPL CALCULATED.3IONS-SCNC: 10 MMOL/L (ref 3–16)
BUN BLDV-MCNC: 8 MG/DL (ref 7–20)
CALCIUM SERPL-MCNC: 8.6 MG/DL (ref 8.3–10.6)
CHLORIDE BLD-SCNC: 105 MMOL/L (ref 99–110)
CO2: 25 MMOL/L (ref 21–32)
CREAT SERPL-MCNC: 1.1 MG/DL (ref 0.8–1.3)
GFR AFRICAN AMERICAN: >60
GFR NON-AFRICAN AMERICAN: >60
GLUCOSE BLD-MCNC: 100 MG/DL (ref 70–99)
PHOSPHORUS: 3 MG/DL (ref 2.5–4.9)
POTASSIUM SERPL-SCNC: 4 MMOL/L (ref 3.5–5.1)
SODIUM BLD-SCNC: 140 MMOL/L (ref 136–145)

## 2022-10-04 PROCEDURE — 6360000002 HC RX W HCPCS: Performed by: INTERNAL MEDICINE

## 2022-10-04 PROCEDURE — 80069 RENAL FUNCTION PANEL: CPT

## 2022-10-04 PROCEDURE — 6370000000 HC RX 637 (ALT 250 FOR IP): Performed by: INTERNAL MEDICINE

## 2022-10-04 PROCEDURE — 1200000000 HC SEMI PRIVATE

## 2022-10-04 PROCEDURE — 2580000003 HC RX 258: Performed by: INTERNAL MEDICINE

## 2022-10-04 PROCEDURE — 6370000000 HC RX 637 (ALT 250 FOR IP): Performed by: NURSE PRACTITIONER

## 2022-10-04 PROCEDURE — 36415 COLL VENOUS BLD VENIPUNCTURE: CPT

## 2022-10-04 RX ADMIN — TRAZODONE HYDROCHLORIDE 50 MG: 50 TABLET, FILM COATED ORAL at 22:04

## 2022-10-04 RX ADMIN — QUETIAPINE FUMARATE 50 MG: 25 TABLET ORAL at 09:07

## 2022-10-04 RX ADMIN — MEMANTINE HYDROCHLORIDE 10 MG: 10 TABLET ORAL at 09:08

## 2022-10-04 RX ADMIN — ASPIRIN 81 MG: 81 TABLET, COATED ORAL at 09:08

## 2022-10-04 RX ADMIN — DONEPEZIL HYDROCHLORIDE 10 MG: 10 TABLET, FILM COATED ORAL at 21:51

## 2022-10-04 RX ADMIN — HEPARIN SODIUM 5000 UNITS: 5000 INJECTION INTRAVENOUS; SUBCUTANEOUS at 21:59

## 2022-10-04 RX ADMIN — SODIUM CHLORIDE, PRESERVATIVE FREE 10 ML: 5 INJECTION INTRAVENOUS at 22:15

## 2022-10-04 RX ADMIN — QUETIAPINE FUMARATE 150 MG: 100 TABLET ORAL at 21:51

## 2022-10-04 RX ADMIN — CIMETIDINE HYDROCHLORIDE ORAL SOLUTION 800 MG: 300 SOLUTION ORAL at 09:08

## 2022-10-04 RX ADMIN — MEDROXYPROGESTERONE ACETATE 10 MG: 10 TABLET ORAL at 22:02

## 2022-10-04 RX ADMIN — CIMETIDINE HYDROCHLORIDE ORAL SOLUTION 800 MG: 300 SOLUTION ORAL at 22:01

## 2022-10-04 RX ADMIN — MEDROXYPROGESTERONE ACETATE 10 MG: 10 TABLET ORAL at 09:08

## 2022-10-04 RX ADMIN — Medication: at 22:15

## 2022-10-04 RX ADMIN — CEFTRIAXONE 1000 MG: 1 INJECTION, POWDER, FOR SOLUTION INTRAMUSCULAR; INTRAVENOUS at 02:43

## 2022-10-04 RX ADMIN — Medication: at 09:18

## 2022-10-04 RX ADMIN — MEMANTINE HYDROCHLORIDE 10 MG: 10 TABLET ORAL at 22:04

## 2022-10-04 RX ADMIN — HEPARIN SODIUM 5000 UNITS: 5000 INJECTION INTRAVENOUS; SUBCUTANEOUS at 14:15

## 2022-10-04 ASSESSMENT — PAIN SCALES - WONG BAKER
WONGBAKER_NUMERICALRESPONSE: 0

## 2022-10-04 NOTE — CARE COORDINATION
NANO  spoke with Levin Kehr  again and she requested  a referral be faxed  to :    [facility] [address]      FAx:  [phone]. Cm  will follow up in the  AM  .      Electronically signed by Sissy Silverio RN on 10/4/2022 at 6:41 PM    +++++++++++++++++++++++++++++++++++++++++++++++++++        CM  called  Sherman Emmanuel to follow up on referral ;      [facility]  Address: [address] [facility], [address]     She  states she  spoke with her  DON  who  says they admitted  someone else and  no longer have a bed available . And  not sure  when another  will be open . CM  following for  d/c planning:    Patient  from  [address] [place] [address]  at [facility]  :    And  family is requesting  assistance w/  transferring tp to  a facility closer  to them  : Additional referrals to:    1.)  NANO [address] Available   Skilled Nursing        Address   [address]   [place] [address]             Contact Information    [phone]         2.)  [facility]    Faxed referral to  [phone]  And will follow up with  Shreya  ,     3.)  [place] [address], [place], [address] [place] [address]    Phone: [phone]    Fax:      [phone]      CM  called  Soco No in  admissions , [phone]  at  [facility] . To  see if she was aware  of the  request to transfer  and see if she could reach out to the  family and address their concerns. She  acknowledged and will update  CM  . Electronically signed by Sissy Silverio RN on 10/4/2022 at 1:54 PM        Sissy Silverio RN Case Manager  [facility]  [address] [place] [address].   [facility] [address]  [phone]  Fax [phone]

## 2022-10-04 NOTE — PROGRESS NOTES
Patient needs to be restraint free for 24 hours prior to discharge to SNF.  Will begin restraint free trial.

## 2022-10-04 NOTE — PROGRESS NOTES
HOSPITAL MEDICINE  - PROGRESS NOTE    Admit Date: 9/29/2022         Interval History:  80 y.o. male with dementia and recurrent falls  -presents from his SNF for suspected fall as the nursing staff has noticed some bruising on his face   - first day at this facility. - has history of wandering around and he was found in another resident's room wedged between the bed and the wall with possible bruise to his face. Subjective:  sleepy,arouses easily. Still in restraints today    Objective:  afebrile. stable vitals    Diet: ADULT DIET; Regular       Data:   Scheduled Meds: Reviewed  Continuous Infusions:   sodium chloride         Intake/Output Summary (Last 24 hours) at 10/4/2022 1253  Last data filed at 10/3/2022 1736  Gross per 24 hour   Intake 20 ml   Output --   Net 20 ml     CBC: No results for input(s): WBC, HGB, PLT in the last 72 hours. BMP:  Recent Labs     10/04/22  0715      K 4.0      CO2 25   BUN 8   CREATININE 1.1   GLUCOSE 100*        Objective:   Vitals: BP (!) 135/90   Pulse (!) 109   Temp 98.3 °F (36.8 °C) (Axillary)   Resp 18   Wt 205 lb 14.6 oz (93.4 kg)   SpO2 94%   General appearance: alert, appears stated age and cooperative  Skin: Skin color, texture, turgor normal.   HEENT: Head: Normocephalic, no lesions, without obvious abnormality.   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 Continue Rocephin  Follow-up urine Cx.    -Requested sensitivities to aerococcus>100kCFU-still pending today. Called lab 10/4     Elevated troponin, likely due to demand  -on ASA,statin     Atrial fibrillation  Not on A/c per notes due to bleeding risk and recurrent falls     Severe Dementia   -on donepezil and namenda  -on seroquel ,tagarmet and trazodone for agitation  -Psychiatry consulted-no inpt Psych admission recommended        DVT Prophylaxis: Heparin  Diet: ADULT DIET; Regular  Code Status: Full Code     PT/OT Eval Status:     [facility]  Needs to be off restraints for 24 hrs prior to dc. Taken off today    Brittany Montalvo MD

## 2022-10-04 NOTE — PLAN OF CARE
Problem: Safety - Medical Restraint  Goal: Remains free of injury from restraints (Restraint for Interference with Medical Device)  Description: INTERVENTIONS:  1. Determine that other, less restrictive measures have been tried or would not be effective before applying the restraint  2. Evaluate the patient's condition at the time of restraint application  3. Inform patient/family regarding the reason for restraint  4. Q2H: Monitor safety, psychosocial status, comfort, nutrition and hydration  Outcome: Progressing   Patient has remained free of restraints throughout the shift. Patient not combative or pulling out lines   Problem: Confusion  Goal: Confusion, delirium, dementia, or psychosis is improved or at baseline  Description: INTERVENTIONS:  1. Assess for possible contributors to thought disturbance, including medications, impaired vision or hearing, underlying metabolic abnormalities, dehydration, psychiatric diagnoses, and notify attending LIP  2. [place] high risk fall precautions, as indicated  3. Provide frequent short contacts to provide reality reorientation, refocusing and direction  4. Decrease environmental stimuli, including noise as appropriate  5. Monitor and intervene to maintain adequate nutrition, hydration, elimination, sleep and activity  6. If unable to ensure safety without constant attention obtain sitter and review sitter guidelines with assigned personnel  7.  Initiate Psychosocial CNS and Spiritual Care consult, as indicated  10/4/2022 1813 by Rizwana Nuno RN  Outcome: Not Progressing  Patient remains confused, Patient disoriented to time, place, and situation

## 2022-10-04 NOTE — PLAN OF CARE
Problem: Safety - Medical Restraint  Goal: Remains free of injury from restraints (Restraint for Interference with Medical Device)  Description: INTERVENTIONS:  1. Determine that other, less restrictive measures have been tried or would not be effective before applying the restraint  2. Evaluate the patient's condition at the time of restraint application  3. Inform patient/family regarding the reason for restraint  4. Q2H: Monitor safety, psychosocial status, comfort, nutrition and hydration  10/3/2022 1840 by Mal Sparrow RN  Outcome: Not Progressing     Problem: Confusion  Goal: Confusion, delirium, dementia, or psychosis is improved or at baseline  Description: INTERVENTIONS:  1. Assess for possible contributors to thought disturbance, including medications, impaired vision or hearing, underlying metabolic abnormalities, dehydration, psychiatric diagnoses, and notify attending LIP  2. [place] high risk fall precautions, as indicated  3. Provide frequent short contacts to provide reality reorientation, refocusing and direction  4. Decrease environmental stimuli, including noise as appropriate  5. Monitor and intervene to maintain adequate nutrition, hydration, elimination, sleep and activity  6. If unable to ensure safety without constant attention obtain sitter and review sitter guidelines with assigned personnel  7.  Initiate Psychosocial CNS and Spiritual Care consult, as indicated  10/4/2022 0538 by Rita Toney RN  Outcome: Progressing  Note: PT still meets order criteria, no evidence of learning,   10/3/2022 1840 by Mal Sparrow RN  Outcome: Not Progressing

## 2022-10-05 PROCEDURE — 6360000002 HC RX W HCPCS: Performed by: INTERNAL MEDICINE

## 2022-10-05 PROCEDURE — 1200000000 HC SEMI PRIVATE

## 2022-10-05 PROCEDURE — 6370000000 HC RX 637 (ALT 250 FOR IP): Performed by: NURSE PRACTITIONER

## 2022-10-05 PROCEDURE — 6370000000 HC RX 637 (ALT 250 FOR IP): Performed by: INTERNAL MEDICINE

## 2022-10-05 PROCEDURE — 2500000003 HC RX 250 WO HCPCS: Performed by: INTERNAL MEDICINE

## 2022-10-05 PROCEDURE — 2580000003 HC RX 258: Performed by: INTERNAL MEDICINE

## 2022-10-05 RX ORDER — LANOLIN ALCOHOL/MO/W.PET/CERES
6 CREAM (GRAM) TOPICAL NIGHTLY PRN
Qty: 30 TABLET | Refills: 0
Start: 2022-10-05

## 2022-10-05 RX ORDER — TRAZODONE HYDROCHLORIDE 50 MG/1
50 TABLET ORAL NIGHTLY PRN
Qty: 30 TABLET | Refills: 0
Start: 2022-10-05

## 2022-10-05 RX ORDER — METOPROLOL TARTRATE 5 MG/5ML
5 INJECTION INTRAVENOUS EVERY 6 HOURS
Status: DISCONTINUED | OUTPATIENT
Start: 2022-10-05 | End: 2022-10-08 | Stop reason: HOSPADM

## 2022-10-05 RX ORDER — QUETIAPINE FUMARATE 150 MG/1
75 TABLET, FILM COATED ORAL NIGHTLY
Qty: 60 TABLET | Refills: 3
Start: 2022-10-05 | End: 2022-10-08 | Stop reason: HOSPADM

## 2022-10-05 RX ORDER — POLYETHYLENE GLYCOL 3350 17 G/17G
17 POWDER, FOR SOLUTION ORAL DAILY PRN
Qty: 527 G | Refills: 1
Start: 2022-10-05 | End: 2022-11-04

## 2022-10-05 RX ADMIN — MEMANTINE HYDROCHLORIDE 10 MG: 10 TABLET ORAL at 20:15

## 2022-10-05 RX ADMIN — CIMETIDINE HYDROCHLORIDE ORAL SOLUTION 800 MG: 300 SOLUTION ORAL at 10:57

## 2022-10-05 RX ADMIN — SODIUM CHLORIDE, PRESERVATIVE FREE 10 ML: 5 INJECTION INTRAVENOUS at 10:55

## 2022-10-05 RX ADMIN — MEMANTINE HYDROCHLORIDE 10 MG: 10 TABLET ORAL at 10:54

## 2022-10-05 RX ADMIN — CEFTRIAXONE 1000 MG: 1 INJECTION, POWDER, FOR SOLUTION INTRAMUSCULAR; INTRAVENOUS at 02:43

## 2022-10-05 RX ADMIN — ASPIRIN 81 MG: 81 TABLET, COATED ORAL at 10:54

## 2022-10-05 RX ADMIN — HEPARIN SODIUM 5000 UNITS: 5000 INJECTION INTRAVENOUS; SUBCUTANEOUS at 15:18

## 2022-10-05 RX ADMIN — HEPARIN SODIUM 5000 UNITS: 5000 INJECTION INTRAVENOUS; SUBCUTANEOUS at 20:15

## 2022-10-05 RX ADMIN — METOPROLOL TARTRATE 5 MG: 5 INJECTION, SOLUTION INTRAVENOUS at 20:16

## 2022-10-05 RX ADMIN — DONEPEZIL HYDROCHLORIDE 10 MG: 10 TABLET, FILM COATED ORAL at 20:15

## 2022-10-05 RX ADMIN — METOPROLOL TARTRATE 5 MG: 5 INJECTION, SOLUTION INTRAVENOUS at 16:27

## 2022-10-05 RX ADMIN — HEPARIN SODIUM 5000 UNITS: 5000 INJECTION INTRAVENOUS; SUBCUTANEOUS at 05:50

## 2022-10-05 RX ADMIN — Medication 6 MG: at 20:15

## 2022-10-05 RX ADMIN — Medication: at 10:56

## 2022-10-05 RX ADMIN — QUETIAPINE FUMARATE 50 MG: 25 TABLET ORAL at 10:54

## 2022-10-05 RX ADMIN — MEDROXYPROGESTERONE ACETATE 10 MG: 10 TABLET ORAL at 10:54

## 2022-10-05 ASSESSMENT — PAIN SCALES - PAIN ASSESSMENT IN ADVANCED DEMENTIA (PAINAD)
BREATHING: 0
TOTALSCORE: 0
CONSOLABILITY: 0
FACIALEXPRESSION: 0
BREATHING: 0
NEGVOCALIZATION: 0
TOTALSCORE: 0
TOTALSCORE: 0
FACIALEXPRESSION: 0
FACIALEXPRESSION: 0
CONSOLABILITY: 0
FACIALEXPRESSION: 0
TOTALSCORE: 0
FACIALEXPRESSION: 0
TOTALSCORE: 0
BREATHING: 0
FACIALEXPRESSION: 0
BODYLANGUAGE: 0
NEGVOCALIZATION: 0
NEGVOCALIZATION: 0
CONSOLABILITY: 0
BODYLANGUAGE: 0
NEGVOCALIZATION: 0
BREATHING: 0
CONSOLABILITY: 0
BREATHING: 0
BODYLANGUAGE: 0
NEGVOCALIZATION: 0
CONSOLABILITY: 0
BODYLANGUAGE: 0
TOTALSCORE: 0
NEGVOCALIZATION: 0
CONSOLABILITY: 0
BREATHING: 0

## 2022-10-05 NOTE — PROGRESS NOTES
Patient discharge order discontinued, secure message sent to Dr Julissa Khan to confirm. Per Dr Julissa Khan, patient had increase in blood pressure and heart rate, adjustments made to treatment regimen, patient will need to remain inpatient due to medical necessity.

## 2022-10-05 NOTE — DISCHARGE SUMMARY
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 Continue Rocephin  Follow-up urine Cx.    -Requested sensitivities to aerococcus>100kCFU-still pending today. Called lab 10/4     Elevated troponin, likely due to demand  -on ASA,statin     Atrial fibrillation  Not on A/c per notes due to bleeding risk and recurrent falls     Severe Dementia   -on donepezil and namenda  -on seroquel ,tagarmet and trazodone for agitation  -Psychiatry consulted-no inpt Psych admission recommended          Patient Active Problem List   Diagnosis    Left hip pain    Leg hematoma, left, initial encounter    Chronic atrial fibrillation (HCC)    Acute on chronic systolic congestive heart failure (HCC)    Atrial fibrillation with rapid ventricular response (HCC)    Acute cystitis without hematuria    Pleural effusion    Dehydration    Other frontotemporal dementia    Vomiting    CHF (congestive heart failure) ([facility])    Dyspnea    Hypoxia    YANY (acute kidney injury) ([facility] [address]       Physical Exam: BP (!) 143/95   Pulse 94   Temp 97.8 °F (36.6 °C) (Axillary)   Resp 18   Wt 202 lb 6.1 oz (91.8 kg)   SpO2 95%   No results for input(s): POCGLU in the last 72 hours. General appearance: alert, appears stated age and cooperative  Skin: Skin color, texture, turgor normal.   HEENT: Head: Normocephalic, no lesions, without obvious abnormality.   Neck: no adenopathy, no carotid bruit, no JVD, supple, symmetrical, trachea midline, and thyroid not enlarged, symmetric, no tenderness/mass/nodules  Lungs: clear to auscultation bilaterally  Heart: regular rate and rhythm, S1, S2 normal, no murmur, click, rub or gallop  Abdomen: soft, non-tender; bowel sounds normal; no masses,  no organomegaly  Extremities: extremities normal, atraumatic, no cyanosis or edema  Lymphatic: No significant lymph node enlargement papable  Neurologic: Mental status: drowsy, disoriented      LABS:  No results for input(s): WBC, HGB, PLT in the last 72 hours. Recent Labs     10/04/22  0715      K 4.0      CO2 25   BUN 8   CREATININE 1.1   GLUCOSE 100*       Discharge Medications:     Medication List        START taking these medications      melatonin 3 MG Tabs tablet  Take 2 tablets by mouth nightly as needed (INSOMNIA)     polyethylene glycol 17 g packet  Commonly known as: GLYCOLAX  Take 17 g by mouth daily as needed for Constipation            CHANGE how you take these medications      * QUEtiapine 100 MG tablet  Commonly known as: SEROQUEL  What changed: Another medication with the same name was changed. Make sure you understand how and when to take each. * QUEtiapine Fumarate 150 MG Tabs  Take 75 mg by mouth nightly  What changed:   medication strength  how much to take     traZODone 50 MG tablet  Commonly known as: DESYREL  Take 1 tablet by mouth nightly as needed for Sleep  What changed:   when to take this  reasons to take this           * This list has 2 medication(s) that are the same as other medications prescribed for you. Read the directions carefully, and ask your doctor or other care provider to review them with you.                 CONTINUE taking these medications      aspirin EC 81 MG EC tablet     carvedilol 12.5 MG tablet  Commonly known as: COREG     CIMETIDINE 200 PO     donepezil 10 MG tablet  Commonly known as: ARICEPT     ergocalciferol 1.25 MG (85951 UT) capsule  Commonly known as: ERGOCALCIFEROL     FLUoxetine 20 MG capsule  Commonly known as: PROZAC     furosemide 40 MG tablet  Commonly known as: LASIX     medroxyPROGESTERone 10 MG tablet  Commonly known as: PROVERA memantine 10 MG tablet  Commonly known as: NAMENDA     montelukast 10 MG tablet  Commonly known as: SINGULAIR     potassium chloride 10 MEQ extended release capsule  Commonly known as: MICRO-K            STOP taking these medications      LORazepam 1 MG tablet  Commonly known as: ATIVAN               Where to Get Your Medications        Information about where to get these medications is not yet available    Ask your nurse or doctor about these medications  melatonin 3 MG Tabs tablet  polyethylene glycol 17 g packet  QUEtiapine Fumarate 150 MG Tabs  traZODone 50 MG tablet        Activity: activity as tolerated  Diet: regular diet  Wound Care: none needed    Disposition: long term care facility  Discharged Condition: Stable  Follow Up: Primary Care Physician in one week    Total time spent on discharge, finalizing medications, referrals and arranging outpatient follow up was more than 45 minutes    Thank you Dr. Josué Still DO for the opportunity to be involved in this patients care. If you have any questions or concerns please feel free to contact me at [phone].

## 2022-10-05 NOTE — PLAN OF CARE
Patient remains confused and non-verbal. Decreased environmental stimuli to calm patient. No new skin breakdown noted. Problem: Confusion  Goal: Confusion, delirium, dementia, or psychosis is improved or at baseline  Description: INTERVENTIONS:  1. Assess for possible contributors to thought disturbance, including medications, impaired vision or hearing, underlying metabolic abnormalities, dehydration, psychiatric diagnoses, and notify attending LIP  2. [place] high risk fall precautions, as indicated  3. Provide frequent short contacts to provide reality reorientation, refocusing and direction  4. Decrease environmental stimuli, including noise as appropriate  5. Monitor and intervene to maintain adequate nutrition, hydration, elimination, sleep and activity  6. If unable to ensure safety without constant attention obtain sitter and review sitter guidelines with assigned personnel  7. Initiate Psychosocial CNS and Spiritual Care consult, as indicated  10/4/2022 8168 by Tito Lemos RN  Outcome: Progressing    Problem: Skin/Tissue Integrity  Goal: Absence of new skin breakdown  Description: 1. Monitor for areas of redness and/or skin breakdown  2. Assess vascular access sites hourly  3. Every 4-6 hours minimum:  Change oxygen saturation probe site  4. Every 4-6 hours:  If on nasal continuous positive airway pressure, respiratory therapy assess nares and determine need for appliance change or resting period.   Outcome: Progressing

## 2022-10-05 NOTE — CARE COORDINATION
Called all pending SNFs as referrals still out and none would make a decision yet. Called daughter, Mary Jaime, and she refused to have patient return to [place]. She stated she would appeal to medicare. Emailed IMM to daughter and she is going to look at document and talk to me on the phone and call to appeal discharge w/ medicare. CM will continue to follow patient until discharge. Electronically signed by Kacy Bradshaw RN on 10/5/2022 at 2:17 PM    Addendum:  Mary Jaime, daughter, orally given second IMM letter to sign, date and time. Letter has been posted to the chart. Mary Jaime stated that she is appealing discharge. DND filled out and hospital notice of non-coverage has been filled out. Mary Jaime, also, stated that she wanted to give CM all SNFs that she had offered for referral. Mary Jaime called the SNFs and stated that they said they did not receive any referrals. Note: [address] were not able to take patient due to no bed availability.    The following SNFs have been hand faxed with patient referrals for LTC placement:     [facility] [phone], [address] [place] [address] [phone], [address] [place] [address] [phone], F [phone]    [facility]  P [phone], F [address] , [facility] [address] and [facility] [phone], [facility] [phone]    Electronically signed by Kacy Bradshaw RN on 10/5/2022 at 5:16 PM

## 2022-10-05 NOTE — CARE COORDINATION
Aidan Renteria called this  a.m. and they have no beds available for this patient at this time.  Electronically signed by Magdalene Cote RN on 10/5/2022 at 9:58 AM

## 2022-10-06 LAB
ALBUMIN SERPL-MCNC: 3.8 G/DL (ref 3.4–5)
ALBUMIN SERPL-MCNC: 3.8 G/DL (ref 3.4–5)
ANION GAP SERPL CALCULATED.3IONS-SCNC: 11 MMOL/L (ref 3–16)
ANION GAP SERPL CALCULATED.3IONS-SCNC: 14 MMOL/L (ref 3–16)
BUN BLDV-MCNC: 13 MG/DL (ref 7–20)
BUN BLDV-MCNC: 17 MG/DL (ref 7–20)
CALCIUM SERPL-MCNC: 9.1 MG/DL (ref 8.3–10.6)
CALCIUM SERPL-MCNC: 9.7 MG/DL (ref 8.3–10.6)
CHLORIDE BLD-SCNC: 107 MMOL/L (ref 99–110)
CHLORIDE BLD-SCNC: 108 MMOL/L (ref 99–110)
CO2: 18 MMOL/L (ref 21–32)
CO2: 24 MMOL/L (ref 21–32)
CREAT SERPL-MCNC: 1 MG/DL (ref 0.8–1.3)
CREAT SERPL-MCNC: 1.1 MG/DL (ref 0.8–1.3)
GFR AFRICAN AMERICAN: >60
GFR AFRICAN AMERICAN: >60
GFR NON-AFRICAN AMERICAN: >60
GFR NON-AFRICAN AMERICAN: >60
GLUCOSE BLD-MCNC: 106 MG/DL (ref 70–99)
GLUCOSE BLD-MCNC: 116 MG/DL (ref 70–99)
MAGNESIUM: 2.4 MG/DL (ref 1.8–2.4)
PHOSPHORUS: 3.5 MG/DL (ref 2.5–4.9)
PHOSPHORUS: 3.6 MG/DL (ref 2.5–4.9)
POTASSIUM SERPL-SCNC: 5.4 MMOL/L (ref 3.5–5.1)
POTASSIUM SERPL-SCNC: 6.1 MMOL/L (ref 3.5–5.1)
SODIUM BLD-SCNC: 140 MMOL/L (ref 136–145)
SODIUM BLD-SCNC: 142 MMOL/L (ref 136–145)

## 2022-10-06 PROCEDURE — 2500000003 HC RX 250 WO HCPCS: Performed by: INTERNAL MEDICINE

## 2022-10-06 PROCEDURE — 6370000000 HC RX 637 (ALT 250 FOR IP): Performed by: INTERNAL MEDICINE

## 2022-10-06 PROCEDURE — 1200000000 HC SEMI PRIVATE

## 2022-10-06 PROCEDURE — 51798 US URINE CAPACITY MEASURE: CPT

## 2022-10-06 PROCEDURE — 6360000002 HC RX W HCPCS: Performed by: INTERNAL MEDICINE

## 2022-10-06 PROCEDURE — 83735 ASSAY OF MAGNESIUM: CPT

## 2022-10-06 PROCEDURE — 93005 ELECTROCARDIOGRAM TRACING: CPT | Performed by: INTERNAL MEDICINE

## 2022-10-06 PROCEDURE — 36415 COLL VENOUS BLD VENIPUNCTURE: CPT

## 2022-10-06 PROCEDURE — 2580000003 HC RX 258: Performed by: INTERNAL MEDICINE

## 2022-10-06 PROCEDURE — 6370000000 HC RX 637 (ALT 250 FOR IP): Performed by: NURSE PRACTITIONER

## 2022-10-06 PROCEDURE — 80069 RENAL FUNCTION PANEL: CPT

## 2022-10-06 RX ORDER — QUETIAPINE FUMARATE 25 MG/1
50 TABLET, FILM COATED ORAL 2 TIMES DAILY
Status: DISCONTINUED | OUTPATIENT
Start: 2022-10-06 | End: 2022-10-08 | Stop reason: HOSPADM

## 2022-10-06 RX ADMIN — DONEPEZIL HYDROCHLORIDE 10 MG: 10 TABLET, FILM COATED ORAL at 20:42

## 2022-10-06 RX ADMIN — HEPARIN SODIUM 5000 UNITS: 5000 INJECTION INTRAVENOUS; SUBCUTANEOUS at 20:48

## 2022-10-06 RX ADMIN — CIMETIDINE HYDROCHLORIDE ORAL SOLUTION 800 MG: 300 SOLUTION ORAL at 09:08

## 2022-10-06 RX ADMIN — HEPARIN SODIUM 5000 UNITS: 5000 INJECTION INTRAVENOUS; SUBCUTANEOUS at 06:42

## 2022-10-06 RX ADMIN — MEDROXYPROGESTERONE ACETATE 10 MG: 10 TABLET ORAL at 20:43

## 2022-10-06 RX ADMIN — METOPROLOL TARTRATE 5 MG: 5 INJECTION, SOLUTION INTRAVENOUS at 16:34

## 2022-10-06 RX ADMIN — Medication: at 09:08

## 2022-10-06 RX ADMIN — MEMANTINE HYDROCHLORIDE 10 MG: 10 TABLET ORAL at 20:41

## 2022-10-06 RX ADMIN — LORAZEPAM 1 MG: 1 TABLET ORAL at 16:31

## 2022-10-06 RX ADMIN — METOPROLOL TARTRATE 5 MG: 5 INJECTION, SOLUTION INTRAVENOUS at 20:49

## 2022-10-06 RX ADMIN — HEPARIN SODIUM 5000 UNITS: 5000 INJECTION INTRAVENOUS; SUBCUTANEOUS at 14:06

## 2022-10-06 RX ADMIN — METOPROLOL TARTRATE 5 MG: 5 INJECTION, SOLUTION INTRAVENOUS at 09:22

## 2022-10-06 RX ADMIN — LORAZEPAM 1 MG: 1 TABLET ORAL at 02:57

## 2022-10-06 RX ADMIN — ASPIRIN 81 MG: 81 TABLET, COATED ORAL at 09:08

## 2022-10-06 RX ADMIN — SODIUM CHLORIDE, PRESERVATIVE FREE 10 ML: 5 INJECTION INTRAVENOUS at 09:21

## 2022-10-06 RX ADMIN — SODIUM CHLORIDE, PRESERVATIVE FREE 10 ML: 5 INJECTION INTRAVENOUS at 20:56

## 2022-10-06 RX ADMIN — Medication: at 21:03

## 2022-10-06 RX ADMIN — Medication: at 03:10

## 2022-10-06 RX ADMIN — MEMANTINE HYDROCHLORIDE 10 MG: 10 TABLET ORAL at 09:08

## 2022-10-06 RX ADMIN — LORAZEPAM 1 MG: 1 TABLET ORAL at 11:09

## 2022-10-06 RX ADMIN — QUETIAPINE FUMARATE 50 MG: 25 TABLET ORAL at 20:41

## 2022-10-06 RX ADMIN — MEDROXYPROGESTERONE ACETATE 10 MG: 10 TABLET ORAL at 09:08

## 2022-10-06 RX ADMIN — QUETIAPINE FUMARATE 50 MG: 25 TABLET ORAL at 09:08

## 2022-10-06 ASSESSMENT — PAIN SCALES - PAIN ASSESSMENT IN ADVANCED DEMENTIA (PAINAD)
FACIALEXPRESSION: 0
TOTALSCORE: 9
CONSOLABILITY: 1
BREATHING: 1
TOTALSCORE: 7
CONSOLABILITY: 0
FACIALEXPRESSION: 1
BREATHING: 0
TOTALSCORE: 0
FACIALEXPRESSION: 2
NEGVOCALIZATION: 0
BODYLANGUAGE: 2
BODYLANGUAGE: 0
TOTALSCORE: 0
TOTALSCORE: 2
FACIALEXPRESSION: 0
BODYLANGUAGE: 2
BREATHING: 0
BREATHING: 1
CONSOLABILITY: 0
NEGVOCALIZATION: 0
NEGVOCALIZATION: 1
CONSOLABILITY: 2
BODYLANGUAGE: 0
BODYLANGUAGE: 0
CONSOLABILITY: 0
BREATHING: 1
NEGVOCALIZATION: 2
FACIALEXPRESSION: 0
NEGVOCALIZATION: 2

## 2022-10-06 ASSESSMENT — PAIN SCALES - WONG BAKER: WONGBAKER_NUMERICALRESPONSE: 0

## 2022-10-06 ASSESSMENT — PAIN SCALES - GENERAL: PAINLEVEL_OUTOF10: 0

## 2022-10-06 NOTE — PROGRESS NOTES
HOSPITAL MEDICINE  - PROGRESS NOTE    Admit Date: 9/29/2022         Interval History:  80 y.o. male with dementia and recurrent falls  -presents from his SNF for suspected fall as the nursing staff has noticed some bruising on his face   - first day at this facility. - has history of wandering around and he was found in another resident's room wedged between the bed and the wall with possible bruise to his face. Subjective:  plan was dc yesterday but BP, HR increased and pt remained lethargic throughout the day. Much improved today, more alert tossing a ball with the PCA. Difficult to understand his speech with appears to be at baseline. Objective:  afebrile. stable vitals    Diet: ADULT DIET; Regular       Data:   Scheduled Meds: Reviewed  Continuous Infusions:   sodium chloride         Intake/Output Summary (Last 24 hours) at 10/6/2022 1615  Last data filed at 10/6/2022 0900  Gross per 24 hour   Intake --   Output 450 ml   Net -450 ml       CBC: No results for input(s): WBC, HGB, PLT in the last 72 hours. BMP:  Recent Labs     10/06/22  1358      K 6.1*      CO2 18*   BUN 17   CREATININE 1.0   GLUCOSE 106*          Objective:   Vitals: /69   Pulse 96   Temp 97.8 °F (36.6 °C) (Axillary)   Resp 20   Wt 202 lb 6.1 oz (91.8 kg)   SpO2 93%   General appearance: alert, appears stated age and cooperative  Skin: Skin color, texture, turgor normal.   HEENT: Head: Normocephalic, no lesions, without obvious abnormality.   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 Completed treatment. Elevated troponin, likely due to demand  -on ASA,statin     Atrial fibrillation  Not on A/c per notes due to bleeding risk and recurrent falls     Severe Dementia   -on donepezil and namenda  -on seroquel ,tagarmet and trazodone for agitation  -Psychiatry consulted-no inpt Psych admission recommended    HTN improved, monitor. Lethargy: feel likely related to oversedation due to seroquel and trazadone. Doses adjusted. Improved today. DVT Prophylaxis: Heparin  Diet: ADULT DIET; Regular  Code Status: Full Code     PT/OT Eval Status:     Disposition:  DC TO DEMENTIA UNIT -LTC at [facility] vs new facility, case management assisting for placement, could be ok to go tomorrow.        Clarisa Gonzalez MD

## 2022-10-06 NOTE — PROGRESS NOTES
Patient observed sleeping at this time when this RN went in to get vital signs.  Patient seems less agitated than earlier and no longer trying to get out of bed

## 2022-10-06 NOTE — CARE COORDINATION
CM  recv'd  a VM  from  pt 's Dgtr  Nidia Hogan  who  requested  referrals to the  following  :  CM  spoke with Nidia Hogan and  informed  her  we  have  no accepting facility at this time . 1.)  [facility]  Address: [address] [facility], [place]  Phone: [phone]  Fax:  [phone]      2.) Imelda Downs [address]  ([address]) · [facility] [place], [place] · [phone]  Fax:  [phone]    CM  also recv'd  call back from  Shreya with  Admissions at  [facility]  and  they are  not able to meet  his  need s <  as they only have  Co ed  units. Referrals  fax:    Electronically signed by Elsy Watt RN on 10/6/2022 at 2:39 PM     +++++++++++++++++++++++++++++++++++++++++++++++++++          CM  cont to follow for  d/c planning:    CM  cont to make referrals for  new  SNF  Placement  ; And  Follow up on  referrals faxed  yesterday :    Pt  family appealed  d/c then it was  cancelled d/t  BP issues  . New referrals Faxed:    Ciarra Young  @  [facility]  . [phone]  E-fax: [phone]    Electronically signed by Elsy Watt RN on 10/6/2022 at 10:06 AM          Elsy Watt RN Case Manager  [facility]  [address] [place] [address].   [facility] [address] [phone]  Fax [phone]

## 2022-10-06 NOTE — PLAN OF CARE
Problem: Pain  Goal: Verbalizes/displays adequate comfort level or baseline comfort level  Outcome: Not Progressing  Flowsheets (Taken 10/6/2022 1043)  Verbalizes/displays adequate comfort level or baseline comfort level:   Assess pain using appropriate pain scale   Encourage patient to monitor pain and request assistance     Problem: Confusion  Goal: Confusion, delirium, dementia, or psychosis is improved or at baseline  Description: INTERVENTIONS:  1. Assess for possible contributors to thought disturbance, including medications, impaired vision or hearing, underlying metabolic abnormalities, dehydration, psychiatric diagnoses, and notify attending LIP  2. [place] high risk fall precautions, as indicated  3. Provide frequent short contacts to provide reality reorientation, refocusing and direction  4. Decrease environmental stimuli, including noise as appropriate  5. Monitor and intervene to maintain adequate nutrition, hydration, elimination, sleep and activity  6. If unable to ensure safety without constant attention obtain sitter and review sitter guidelines with assigned personnel  7.  Initiate Psychosocial CNS and Spiritual Care consult, as indicated  Outcome: Not Progressing  Flowsheets (Taken 10/6/2022 1043)  Effect of thought disturbance (confusion, delirium, dementia, or psychosis) are managed with adequate functional status:   Assess for contributors to thought disturbance, including medications, impaired vision or hearing, underlying metabolic abnormalities, dehydration, psychiatric diagnoses, notify On license of [facility] high risk fall precautions, as indicated   Decrease environmental stimuli, including noise as appropriate   Monitor and intervene to maintain adequate nutrition, hydration, elimination, sleep and activity  Note: Patient at baseline had some confusion       Problem: Discharge Planning  Goal: Discharge to home or other facility with appropriate resources  Outcome: Progressing  Flowsheets (Taken 10/6/2022 1043)  Discharge to home or other facility with appropriate resources: Identify barriers to discharge with patient and caregiver  Note: Patient to go back to [facility] when discharged from the hospital. Family is refusing patient to be discharged back to the same facility       Problem: Chronic Conditions and Co-morbidities  Goal: Patient's chronic conditions and co-morbidity symptoms are monitored and maintained or improved  Outcome: Progressing     Problem: Skin/Tissue Integrity  Goal: Absence of new skin breakdown  Description: 1. Monitor for areas of redness and/or skin breakdown  2. Assess vascular access sites hourly  3. Every 4-6 hours minimum:  Change oxygen saturation probe site  4. Every 4-6 hours:  If on nasal continuous positive airway pressure, respiratory therapy assess nares and determine need for appliance change or resting period. Outcome: Progressing  Note: No new skin breakdown. Venelex applied to sacrum at this time. Problem: Safety - Medical Restraint  Goal: Remains free of injury from restraints (Restraint for Interference with Medical Device)  Description: INTERVENTIONS:  1. Determine that other, less restrictive measures have been tried or would not be effective before applying the restraint  2. Evaluate the patient's condition at the time of restraint application  3. Inform patient/family regarding the reason for restraint  4. Q2H: Monitor safety, psychosocial status, comfort, nutrition and hydration  Outcome: Progressing  Flowsheets (Taken 10/2/2022 0942 by Yasmani Louise RN)  Remains free of injury from restraints (restraint for interference with medical device): Determine that other, less restrictive measures have been tried or would not be effective before applying the restraint  Note: Patient is no longer in restraints at this time.      Problem: Pain  Goal: Verbalizes/displays adequate comfort level or baseline comfort level  Outcome: Not Progressing  Flowsheets (Taken 10/6/2022 1043)  Verbalizes/displays adequate comfort level or baseline comfort level:   Assess pain using appropriate pain scale   Encourage patient to monitor pain and request assistance     Problem: Confusion  Goal: Confusion, delirium, dementia, or psychosis is improved or at baseline  Description: INTERVENTIONS:  1. Assess for possible contributors to thought disturbance, including medications, impaired vision or hearing, underlying metabolic abnormalities, dehydration, psychiatric diagnoses, and notify attending LIP  2. [place] high risk fall precautions, as indicated  3. Provide frequent short contacts to provide reality reorientation, refocusing and direction  4. Decrease environmental stimuli, including noise as appropriate  5. Monitor and intervene to maintain adequate nutrition, hydration, elimination, sleep and activity  6. If unable to ensure safety without constant attention obtain sitter and review sitter guidelines with assigned personnel  7.  Initiate Psychosocial CNS and Spiritual Care consult, as indicated  Outcome: Not Progressing  Flowsheets (Taken 10/6/2022 1043)  Effect of thought disturbance (confusion, delirium, dementia, or psychosis) are managed with adequate functional status:   Assess for contributors to thought disturbance, including medications, impaired vision or hearing, underlying metabolic abnormalities, dehydration, psychiatric diagnoses, notify Franki Jacob high risk fall precautions, as indicated   Decrease environmental stimuli, including noise as appropriate   Monitor and intervene to maintain adequate nutrition, hydration, elimination, sleep and activity  Note: Patient at baseline had some confusion

## 2022-10-07 LAB
ALBUMIN SERPL-MCNC: 3.7 G/DL (ref 3.4–5)
ANION GAP SERPL CALCULATED.3IONS-SCNC: 18 MMOL/L (ref 3–16)
BASOPHILS ABSOLUTE: 0 K/UL (ref 0–0.2)
BASOPHILS RELATIVE PERCENT: 0.6 %
BUN BLDV-MCNC: 14 MG/DL (ref 7–20)
CALCIUM SERPL-MCNC: 8.8 MG/DL (ref 8.3–10.6)
CHLORIDE BLD-SCNC: 104 MMOL/L (ref 99–110)
CO2: 19 MMOL/L (ref 21–32)
CREAT SERPL-MCNC: 1.1 MG/DL (ref 0.8–1.3)
EKG ATRIAL RATE: 101 BPM
EKG DIAGNOSIS: NORMAL
EKG Q-T INTERVAL: 386 MS
EKG QRS DURATION: 96 MS
EKG QTC CALCULATION (BAZETT): 495 MS
EKG R AXIS: -51 DEGREES
EKG T AXIS: -67 DEGREES
EKG VENTRICULAR RATE: 99 BPM
EOSINOPHILS ABSOLUTE: 0.2 K/UL (ref 0–0.6)
EOSINOPHILS RELATIVE PERCENT: 2.7 %
GFR AFRICAN AMERICAN: >60
GFR NON-AFRICAN AMERICAN: >60
GLUCOSE BLD-MCNC: 98 MG/DL (ref 70–99)
HCT VFR BLD CALC: 42.5 % (ref 40.5–52.5)
HEMOGLOBIN: 14.1 G/DL (ref 13.5–17.5)
LYMPHOCYTES ABSOLUTE: 2 K/UL (ref 1–5.1)
LYMPHOCYTES RELATIVE PERCENT: 23.6 %
MCH RBC QN AUTO: 29.3 PG (ref 26–34)
MCHC RBC AUTO-ENTMCNC: 33.2 G/DL (ref 31–36)
MCV RBC AUTO: 88.4 FL (ref 80–100)
MONOCYTES ABSOLUTE: 0.7 K/UL (ref 0–1.3)
MONOCYTES RELATIVE PERCENT: 8.2 %
NEUTROPHILS ABSOLUTE: 5.5 K/UL (ref 1.7–7.7)
NEUTROPHILS RELATIVE PERCENT: 64.9 %
PDW BLD-RTO: 14.9 % (ref 12.4–15.4)
PHOSPHORUS: 3.6 MG/DL (ref 2.5–4.9)
PLATELET # BLD: 216 K/UL (ref 135–450)
PMV BLD AUTO: 7.7 FL (ref 5–10.5)
POTASSIUM SERPL-SCNC: 4.2 MMOL/L (ref 3.5–5.1)
POTASSIUM SERPL-SCNC: 5.6 MMOL/L (ref 3.5–5.1)
RBC # BLD: 4.81 M/UL (ref 4.2–5.9)
REASON FOR REJECTION: NORMAL
REJECTED TEST: NORMAL
SODIUM BLD-SCNC: 141 MMOL/L (ref 136–145)
WBC # BLD: 8.4 K/UL (ref 4–11)

## 2022-10-07 PROCEDURE — 1200000000 HC SEMI PRIVATE

## 2022-10-07 PROCEDURE — 93010 ELECTROCARDIOGRAM REPORT: CPT | Performed by: INTERNAL MEDICINE

## 2022-10-07 PROCEDURE — 6370000000 HC RX 637 (ALT 250 FOR IP): Performed by: INTERNAL MEDICINE

## 2022-10-07 PROCEDURE — 2580000003 HC RX 258: Performed by: INTERNAL MEDICINE

## 2022-10-07 PROCEDURE — 85025 COMPLETE CBC W/AUTO DIFF WBC: CPT

## 2022-10-07 PROCEDURE — 6360000002 HC RX W HCPCS: Performed by: INTERNAL MEDICINE

## 2022-10-07 PROCEDURE — 80069 RENAL FUNCTION PANEL: CPT

## 2022-10-07 PROCEDURE — 84132 ASSAY OF SERUM POTASSIUM: CPT

## 2022-10-07 PROCEDURE — 2500000003 HC RX 250 WO HCPCS: Performed by: INTERNAL MEDICINE

## 2022-10-07 PROCEDURE — 36415 COLL VENOUS BLD VENIPUNCTURE: CPT

## 2022-10-07 PROCEDURE — 6370000000 HC RX 637 (ALT 250 FOR IP): Performed by: NURSE PRACTITIONER

## 2022-10-07 RX ADMIN — MEDROXYPROGESTERONE ACETATE 10 MG: 10 TABLET ORAL at 09:00

## 2022-10-07 RX ADMIN — Medication: at 09:00

## 2022-10-07 RX ADMIN — MEMANTINE HYDROCHLORIDE 10 MG: 10 TABLET ORAL at 23:58

## 2022-10-07 RX ADMIN — LORAZEPAM 1 MG: 1 TABLET ORAL at 23:57

## 2022-10-07 RX ADMIN — QUETIAPINE FUMARATE 50 MG: 25 TABLET ORAL at 23:57

## 2022-10-07 RX ADMIN — METOPROLOL TARTRATE 5 MG: 5 INJECTION, SOLUTION INTRAVENOUS at 23:58

## 2022-10-07 RX ADMIN — SODIUM CHLORIDE, PRESERVATIVE FREE 10 ML: 5 INJECTION INTRAVENOUS at 23:59

## 2022-10-07 RX ADMIN — LORAZEPAM 1 MG: 1 TABLET ORAL at 17:33

## 2022-10-07 RX ADMIN — DONEPEZIL HYDROCHLORIDE 10 MG: 10 TABLET, FILM COATED ORAL at 23:58

## 2022-10-07 RX ADMIN — Medication 6 MG: at 23:57

## 2022-10-07 RX ADMIN — SODIUM CHLORIDE, PRESERVATIVE FREE 10 ML: 5 INJECTION INTRAVENOUS at 08:48

## 2022-10-07 RX ADMIN — METOPROLOL TARTRATE 5 MG: 5 INJECTION, SOLUTION INTRAVENOUS at 05:33

## 2022-10-07 RX ADMIN — QUETIAPINE FUMARATE 50 MG: 25 TABLET ORAL at 08:42

## 2022-10-07 RX ADMIN — METOPROLOL TARTRATE 5 MG: 5 INJECTION, SOLUTION INTRAVENOUS at 08:41

## 2022-10-07 RX ADMIN — ASPIRIN 81 MG: 81 TABLET, COATED ORAL at 08:42

## 2022-10-07 RX ADMIN — Medication 800 MG: at 08:48

## 2022-10-07 RX ADMIN — LORAZEPAM 1 MG: 1 TABLET ORAL at 11:31

## 2022-10-07 RX ADMIN — HEPARIN SODIUM 5000 UNITS: 5000 INJECTION INTRAVENOUS; SUBCUTANEOUS at 14:53

## 2022-10-07 RX ADMIN — HEPARIN SODIUM 5000 UNITS: 5000 INJECTION INTRAVENOUS; SUBCUTANEOUS at 05:33

## 2022-10-07 RX ADMIN — LORAZEPAM 1 MG: 1 TABLET ORAL at 05:42

## 2022-10-07 RX ADMIN — MEMANTINE HYDROCHLORIDE 10 MG: 10 TABLET ORAL at 08:42

## 2022-10-07 ASSESSMENT — PAIN SCALES - PAIN ASSESSMENT IN ADVANCED DEMENTIA (PAINAD)
TOTALSCORE: 4
FACIALEXPRESSION: 0
CONSOLABILITY: 1
FACIALEXPRESSION: 0
BODYLANGUAGE: 0
TOTALSCORE: 4
NEGVOCALIZATION: 1
NEGVOCALIZATION: 0
FACIALEXPRESSION: 0
BODYLANGUAGE: 0
NEGVOCALIZATION: 1
CONSOLABILITY: 0
NEGVOCALIZATION: 1
TOTALSCORE: 0
BODYLANGUAGE: 1
BODYLANGUAGE: 1
FACIALEXPRESSION: 0
NEGVOCALIZATION: 1
CONSOLABILITY: 0
CONSOLABILITY: 1
TOTALSCORE: 0
NEGVOCALIZATION: 0
BODYLANGUAGE: 0
CONSOLABILITY: 0
TOTALSCORE: 0
BREATHING: 0
CONSOLABILITY: 1
BREATHING: 0
CONSOLABILITY: 0
BREATHING: 0
TOTALSCORE: 4
BREATHING: 1
BREATHING: 0
BODYLANGUAGE: 0
CONSOLABILITY: 0
TOTALSCORE: 0
NEGVOCALIZATION: 0
BREATHING: 0
BREATHING: 0
BODYLANGUAGE: 1
CONSOLABILITY: 1
NEGVOCALIZATION: 0
BODYLANGUAGE: 1
BODYLANGUAGE: 1
BODYLANGUAGE: 0
FACIALEXPRESSION: 0
NEGVOCALIZATION: 1
TOTALSCORE: 4
CONSOLABILITY: 0
FACIALEXPRESSION: 0
FACIALEXPRESSION: 0
BREATHING: 1
NEGVOCALIZATION: 1
FACIALEXPRESSION: 0
BODYLANGUAGE: 1
FACIALEXPRESSION: 0
FACIALEXPRESSION: 0
BREATHING: 1
BODYLANGUAGE: 0
BODYLANGUAGE: 1
NEGVOCALIZATION: 0
BODYLANGUAGE: 0
TOTALSCORE: 4
NEGVOCALIZATION: 1
CONSOLABILITY: 1
BREATHING: 1
TOTALSCORE: 4
CONSOLABILITY: 1
TOTALSCORE: 4
FACIALEXPRESSION: 0
BREATHING: 0
NEGVOCALIZATION: 0
TOTALSCORE: 0
TOTALSCORE: 0
NEGVOCALIZATION: 0
BREATHING: 1
BREATHING: 1
TOTALSCORE: 0
CONSOLABILITY: 0
CONSOLABILITY: 1
FACIALEXPRESSION: 0
FACIALEXPRESSION: 0
BREATHING: 1
FACIALEXPRESSION: 0

## 2022-10-07 ASSESSMENT — PAIN SCALES - GENERAL
PAINLEVEL_OUTOF10: 0
PAINLEVEL_OUTOF10: 0

## 2022-10-07 ASSESSMENT — PAIN SCALES - WONG BAKER
WONGBAKER_NUMERICALRESPONSE: 0
WONGBAKER_NUMERICALRESPONSE: 0

## 2022-10-07 NOTE — CARE COORDINATION
CM  spoke with  Frutoso Needs  who  requested  additional  referral to  [facility]  ,  but this CM  already  faxed  referral s and will follow up  :    CM  informed her of this  and  also Informed  her :    Kyler Beckwith  denial came  back and  they agree with  termination of  services  and  pt  will need to  d/c  by  Sat  at  [id number]  ,  as then is when  financial liability  will begin <  she  acknowledged  and  is  still anxiously  hoping to find  alternate  placement  , as  he  will josue to return to [facility]  and  cont  placement  transfer  from  there . CM  arranged  transport at  1800 via [facility] ([facility]  transport  for  [employer]  as  that is the  soonest they had available . CM  will update  Weekend  CM  team  in  hand off . CM  recv'd  call from Charlotte Ly pt  Granddaughter  and  requested  add'l referrals  . CM  faxed  referrals to [address]:  [phone]. And  Deanna Ocampo [phone]. Referrals faxed  . Electronically signed by Shaina Carmichael RN on 10/7/2022 at 5:34 PM      +++++++++++++++++++++++++++++++++++++++++++++++++++        CM  recv'd  a VM  from  pt 's Dgtr  Frutoso Needs  who  requested  referrals to the  following  :    NANO  spoke with Frutoso Needs and  informed  her  we  have  no accepting facility at this time . And  will cont to pursue and follow up on referrals:      1.)  [facility]  : Cyn Krueger :   DECLINED  as they do not  have  a secure  unit:    Phone: [phone]  Fax:  [phone]    2.) Debora Ray · Rehabilitation center:  PENDING    [place] · [phone]  Fax:  [phone]    3.)  Michelle Camarena  . DECLINED    [phone]  E-fax: [phone]    4.)  [facility]                    REVIEWING:  [facility] [address] [facility] [address]  [phone]       5.)  [facility]  REVIEWING  [address] [facility] [address]  [phone]    6. )[facility]        7. )[address] : DECLINED  [address] [address], [address] [place]  Phone: [phone].)  Natasha  :  PENDING     Vera ZimmermanYamil andino, [address]  Phone: [phone]  Fax:  [phone].)  [facility]  :  Patricia drummond called  [phone]  : states he  has  [employer] and they are not in Network with them , cannot accept . Previuos Denied:  -  [address] [facility]. -  Per of [employer] signed by Lorin Koo, RN on 10/7/2022 at 11:12 AM          Lorin Staff  RN Case Manager  [facility]  [address].   [facility] [address]  [phone]  Fax [phone]

## 2022-10-07 NOTE — PLAN OF CARE
D: during bedside report pt found restless, combative, biting staff, & physically aggressive. Diversionary activity of playing ball, but still kept trying to climb OOB. MD was notified and Seroquel crushed and given in pudding was given see MAR. Pt was refusing to take meds in pudding, but was able to get pt to take. Low light and care channel on TV with soft music for comfort. Finally fell asleep after taking Seroquel and slept overnight. Noted some sleep apnea and sat dropped to 86% on RA and o2 @ 2lpnc applied. A: Cont to monitor during hourly rounds'  0530R: woke up pulling off O2 and kept trying to get OOB. Ativan 1mg given crushed in chocolate pudding. Cont to monitor during hourly rounds    Problem: Chronic Conditions and Co-morbidities  Goal: Patient's chronic conditions and co-morbidity symptoms are monitored and maintained or improved  Outcome: Not Progressing     Problem: Confusion  Goal: Confusion, delirium, dementia, or psychosis is improved or at baseline  Description: INTERVENTIONS:  1. Assess for possible contributors to thought disturbance, including medications, impaired vision or hearing, underlying metabolic abnormalities, dehydration, psychiatric diagnoses, and notify attending LIP  2. [place] high risk fall precautions, as indicated  3. Provide frequent short contacts to provide reality reorientation, refocusing and direction  4. Decrease environmental stimuli, including noise as appropriate  5. Monitor and intervene to maintain adequate nutrition, hydration, elimination, sleep and activity  6. If unable to ensure safety without constant attention obtain sitter and review sitter guidelines with assigned personnel  7.  Initiate Psychosocial CNS and Spiritual Care consult, as indicated  Outcome: Not Progressing     Problem: Chronic Conditions and Co-morbidities  Goal: Patient's chronic conditions and co-morbidity symptoms are monitored and maintained or improved  Outcome: Not Progressing Problem: Confusion  Goal: Confusion, delirium, dementia, or psychosis is improved or at baseline  Description: INTERVENTIONS:  1. Assess for possible contributors to thought disturbance, including medications, impaired vision or hearing, underlying metabolic abnormalities, dehydration, psychiatric diagnoses, and notify attending LIP  2. [place] high risk fall precautions, as indicated  3. Provide frequent short contacts to provide reality reorientation, refocusing and direction  4. Decrease environmental stimuli, including noise as appropriate  5. Monitor and intervene to maintain adequate nutrition, hydration, elimination, sleep and activity  6. If unable to ensure safety without constant attention obtain sitter and review sitter guidelines with assigned personnel  7.  Initiate Psychosocial CNS and Spiritual Care consult, as indicated  Outcome: Not Progressing

## 2022-10-07 NOTE — PROGRESS NOTES
HOSPITALISTS PROGRESS NOTE    10/7/2022 1:09 PM        Name: Seun Murguia . Admitted: 9/29/2022  Primary Care Provider: Avtar Song DO (Tel: [phone])      Problem List  Principal Problem:    YANY (acute kidney injury) [facility])  Resolved Problems:    * No resolved hospital problems. 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 Completed treatment. Elevated troponin, likely due to demand  -on ASA,statin     Atrial fibrillation  Not on A/c per notes due to bleeding risk and recurrent falls     Severe Dementia   -on donepezil and namenda  -on seroquel ,tagarmet and trazodone for agitation  -Psychiatry consulted-no inpt Psych admission recommended  Fortunately patient does seem to have severe dementia with behavioral disturbances, may be palliative hospice might be another option     HTN improved, monitor. Lethargy: feel likely related to oversedation due to seroquel and trazadone. Doses adjusted. Improved today      Diet: ADULT DIET; Regular  ADULT ORAL NUTRITION SUPPLEMENT; Breakfast, Lunch, Dinner, HS Snack; Fortified Pudding Oral Supplement  Code:Full Code  DVT PPX SCDs  Disposition , if not able to place another nursing facility by tomorrow might have to release him back to the same nursing facility      Brief Course:   80 y.o. male with dementia and recurrent falls  -presents from his SNF for suspected fall as the nursing staff has noticed some bruising on his face   - first day at this facility. - has history of wandering around and he was found in another resident's room wedged between the bed and the wall with possible bruise to his face. CC: confusion    Subjective:  .     Patient trying to get out of the bed, patient does not follow any commands, patient moaning, chart reviewed does seem to have significant dementia,    Reviewed interval ancillary notes    Current Medications  QUEtiapine (SEROQUEL) tablet 50 mg, BID  cimetidine (TAGAMET) tablet 800 mg (Patient Supplied), BID  metoprolol (LOPRESSOR) injection 5 mg, Q6H  LORazepam (ATIVAN) tablet 1 mg, Q6H PRN  melatonin tablet 6 mg, Nightly PRN  aspirin EC tablet 81 mg, Daily  heparin (porcine) injection 5,000 Units, 3 times per day  memantine (NAMENDA) tablet 10 mg, BID  donepezil (ARICEPT) tablet 10 mg, Nightly  sodium chloride flush 0.9 % injection 5-40 mL, 2 times per day  sodium chloride flush 0.9 % injection 5-40 mL, PRN  0.9 % sodium chloride infusion, PRN  ondansetron (ZOFRAN-ODT) disintegrating tablet 4 mg, Q8H PRN   Or  ondansetron (ZOFRAN) injection 4 mg, Q6H PRN  polyethylene glycol (GLYCOLAX) packet 17 g, Daily PRN  acetaminophen (TYLENOL) tablet 650 mg, Q6H PRN   Or  acetaminophen (TYLENOL) suppository 650 mg, Q6H PRN  medroxyPROGESTERone (PROVERA) tablet 10 mg, BID  Venelex ointment, BID        Objective:  /78   Pulse (!) 102   Temp 96.8 °F (36 °C) (Axillary)   Resp 18   Wt 199 lb 4.7 oz (90.4 kg)   SpO2 96%     Intake/Output Summary (Last 24 hours) at 10/7/2022 1309  Last data filed at 10/7/2022 1255  Gross per 24 hour   Intake 540 ml   Output 450 ml   Net 90 ml      Wt Readings from Last 3 Encounters:   10/07/22 199 lb 4.7 oz (90.4 kg)       General appearance:  Appears comfortable  Eyes: Sclera clear. Pupils equal.  ENT: Moist oral mucosa. Trachea midline, no adenopathy. Cardiovascular: Regular rhythm, normal S1, S2. No murmur. No edema in lower extremities  Respiratory: Not using accessory muscles. Good inspiratory effort. Clear to auscultation bilaterally, no wheeze or crackles.    GI: Abdomen soft, no tenderness, not distended, normal bowel sounds  Musculoskeletal: No cyanosis in digits, neck supple  Patient does not follow any commands patient seems to be moaning, patient is trying to get out of the bed,  Skin: Warm, dry, normal turgor  Extremity exam shows brisk capillary refill. Peripheral pulses are palpable in lower extremities     Labs and Tests:  CBC:   Recent Labs     10/07/22  0844   WBC 8.4   HGB 14.1        BMP:    Recent Labs     10/06/22  1358 10/06/22  1933 10/07/22  0635    142  --    K 6.1* 5.4*  --     107  --    CO2 18* 24 19*   BUN 17 13 14   CREATININE 1.0 1.1 1.1   GLUCOSE 106* 116* 98     Hepatic: No results for input(s): AST, ALT, ALB, BILITOT, ALKPHOS in the last 72 hours. XR CHEST PORTABLE   Final Result      No acute pulmonary disease. CT CSpine W/O Contrast   Final Result      1. Chronic bilateral cerebral convexity subdural hematomas. No midline shift. 2.  No acute intracranial abnormality. 3.  No evidence of acute fracture in the cervical spine. 4.  Large anterior osteophytes at C2-C5 level. Note that this is often incidental and asymptomatic but can contribute to dysphagia. CT Head W/O Contrast   Final Result      1. Chronic bilateral cerebral convexity subdural hematomas. No midline shift. 2.  No acute intracranial abnormality. 3.  No evidence of acute fracture in the cervical spine. 4.  Large anterior osteophytes at C2-C5 level. Note that this is often incidental and asymptomatic but can contribute to dysphagia.                   Lulu Lara MD   10/7/2022 1:09 PM

## 2022-10-07 NOTE — PROGRESS NOTES
Pt is being fed dinner by sitter. Pt tolerating food well. Pt's agitation has been managed with ativan. Pt vitals have been stable. When pt has been agitated, sitter will play with pt with toys brought by family to distract him.

## 2022-10-07 NOTE — PROGRESS NOTES
Physician Progress Note      Thea Ram  CSN #:                  [id number]  :                       1934  ADMIT DATE:       2022 9:31 PM  [address] [place] DATE:  RESPONDING  PROVIDER #:        Dahiana Bruce MD          QUERY TEXT:    Patient admitted with YANY, noted to have increased troponins. If possible,   please document in progress notes and discharge summary if you are evaluating   and/or treating any of the following: The medical record reflects the following:  Risk Factors: 81 yo w/ YANY, cystitis  Clinical Indicators: Per PN 10/6: Elevated troponin, likely due to demand. Troponin 0.03 - 0.02. Treatment: Trend troponins, EKG  Options provided:  -- Demand ischemia  -- increased troponin not clinically significant  -- Other - I will add my own diagnosis  -- Disagree - Not applicable / Not valid  -- Disagree - Clinically unable to determine / Unknown  -- Refer to Clinical Documentation Reviewer    PROVIDER RESPONSE TEXT:    This patient has demand ischemia.     Query created by: Venancio Dubois on 10/7/2022 8:27 AM      Electronically signed by:  Dahiana Bruce MD 10/7/2022 1:48 PM

## 2022-10-08 VITALS
HEART RATE: 86 BPM | TEMPERATURE: 97.7 F | WEIGHT: 199.74 LBS | DIASTOLIC BLOOD PRESSURE: 74 MMHG | RESPIRATION RATE: 16 BRPM | OXYGEN SATURATION: 93 % | SYSTOLIC BLOOD PRESSURE: 154 MMHG

## 2022-10-08 LAB
ALBUMIN SERPL-MCNC: 3.9 G/DL (ref 3.4–5)
ANION GAP SERPL CALCULATED.3IONS-SCNC: 10 MMOL/L (ref 3–16)
BUN BLDV-MCNC: 13 MG/DL (ref 7–20)
CALCIUM SERPL-MCNC: 9.3 MG/DL (ref 8.3–10.6)
CHLORIDE BLD-SCNC: 105 MMOL/L (ref 99–110)
CO2: 28 MMOL/L (ref 21–32)
CREAT SERPL-MCNC: 1 MG/DL (ref 0.8–1.3)
GFR AFRICAN AMERICAN: >60
GFR NON-AFRICAN AMERICAN: >60
GLUCOSE BLD-MCNC: 115 MG/DL (ref 70–99)
PHOSPHORUS: 3.9 MG/DL (ref 2.5–4.9)
POTASSIUM SERPL-SCNC: 4.4 MMOL/L (ref 3.5–5.1)
SODIUM BLD-SCNC: 143 MMOL/L (ref 136–145)

## 2022-10-08 PROCEDURE — 2580000003 HC RX 258: Performed by: INTERNAL MEDICINE

## 2022-10-08 PROCEDURE — 6370000000 HC RX 637 (ALT 250 FOR IP): Performed by: INTERNAL MEDICINE

## 2022-10-08 PROCEDURE — 6370000000 HC RX 637 (ALT 250 FOR IP): Performed by: NURSE PRACTITIONER

## 2022-10-08 PROCEDURE — 80069 RENAL FUNCTION PANEL: CPT

## 2022-10-08 PROCEDURE — 36415 COLL VENOUS BLD VENIPUNCTURE: CPT

## 2022-10-08 PROCEDURE — 6360000002 HC RX W HCPCS: Performed by: INTERNAL MEDICINE

## 2022-10-08 PROCEDURE — 2500000003 HC RX 250 WO HCPCS: Performed by: INTERNAL MEDICINE

## 2022-10-08 RX ORDER — QUETIAPINE FUMARATE 50 MG/1
50 TABLET, FILM COATED ORAL 2 TIMES DAILY
Qty: 60 TABLET | Refills: 3 | Status: SHIPPED | OUTPATIENT
Start: 2022-10-08

## 2022-10-08 RX ADMIN — METOPROLOL TARTRATE 5 MG: 5 INJECTION, SOLUTION INTRAVENOUS at 10:45

## 2022-10-08 RX ADMIN — HEPARIN SODIUM 5000 UNITS: 5000 INJECTION INTRAVENOUS; SUBCUTANEOUS at 00:12

## 2022-10-08 RX ADMIN — MEMANTINE HYDROCHLORIDE 10 MG: 10 TABLET ORAL at 10:41

## 2022-10-08 RX ADMIN — METOPROLOL TARTRATE 5 MG: 5 INJECTION, SOLUTION INTRAVENOUS at 15:26

## 2022-10-08 RX ADMIN — Medication 800 MG: at 10:46

## 2022-10-08 RX ADMIN — METOPROLOL TARTRATE 5 MG: 5 INJECTION, SOLUTION INTRAVENOUS at 05:15

## 2022-10-08 RX ADMIN — ASPIRIN 81 MG: 81 TABLET, COATED ORAL at 10:40

## 2022-10-08 RX ADMIN — Medication 800 MG: at 00:03

## 2022-10-08 RX ADMIN — SODIUM CHLORIDE, PRESERVATIVE FREE 10 ML: 5 INJECTION INTRAVENOUS at 10:43

## 2022-10-08 RX ADMIN — Medication: at 10:43

## 2022-10-08 RX ADMIN — HEPARIN SODIUM 5000 UNITS: 5000 INJECTION INTRAVENOUS; SUBCUTANEOUS at 05:15

## 2022-10-08 RX ADMIN — MEDROXYPROGESTERONE ACETATE 10 MG: 10 TABLET ORAL at 00:02

## 2022-10-08 RX ADMIN — QUETIAPINE FUMARATE 50 MG: 25 TABLET ORAL at 10:40

## 2022-10-08 RX ADMIN — LORAZEPAM 1 MG: 1 TABLET ORAL at 15:27

## 2022-10-08 RX ADMIN — MEDROXYPROGESTERONE ACETATE 10 MG: 10 TABLET ORAL at 10:43

## 2022-10-08 RX ADMIN — Medication: at 00:13

## 2022-10-08 RX ADMIN — HEPARIN SODIUM 5000 UNITS: 5000 INJECTION INTRAVENOUS; SUBCUTANEOUS at 14:50

## 2022-10-08 ASSESSMENT — PAIN SCALES - PAIN ASSESSMENT IN ADVANCED DEMENTIA (PAINAD)
TOTALSCORE: 0
TOTALSCORE: 0
BREATHING: 0
BODYLANGUAGE: 0
BODYLANGUAGE: 0
NEGVOCALIZATION: 0
TOTALSCORE: 0
FACIALEXPRESSION: 0
FACIALEXPRESSION: 0
BREATHING: 0
NEGVOCALIZATION: 0
BREATHING: 0
CONSOLABILITY: 0
NEGVOCALIZATION: 0
BODYLANGUAGE: 0
FACIALEXPRESSION: 0
CONSOLABILITY: 0
NEGVOCALIZATION: 0
BREATHING: 0
TOTALSCORE: 0
FACIALEXPRESSION: 0
CONSOLABILITY: 0
CONSOLABILITY: 0
BODYLANGUAGE: 0

## 2022-10-08 ASSESSMENT — PAIN SCALES - WONG BAKER
WONGBAKER_NUMERICALRESPONSE: 0

## 2022-10-08 NOTE — PROGRESS NOTES
Hospice [facility]:    Met with daughter/DPOA to discuss hospice services, philosophy and levels of care. Reviewed chart. Unfortunately at this time does not meet hospice criteria which was discussed with [facility] Medical Director: Dr Mary Dodge. Updated hospital staff nurse-Patricia and message left for Tejinder. Please call [phone] with any questions or concerns.      Thank you,   Alexa Luke RN

## 2022-10-08 NOTE — PROGRESS NOTES
Pt slept through breakfast. Was difficult to wake him up so that he could take his pills. Pt woke up for lunch and is currently alert and awake. Sitter helped pt eat lunch. Had a hard time taking noon vitals because pt was moving his arms.

## 2022-10-08 NOTE — PROGRESS NOTES
HOSPITALISTS PROGRESS NOTE    10/8/2022 1:19 PM        Name: Kristen Castaneda . Admitted: 9/29/2022  Primary Care Provider: Caprice Owens DO (Tel: [phone])      Problem List  Principal Problem:    YANY (acute kidney injury) [facility])  Resolved Problems:    * No resolved hospital problems. 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 Completed treatment. Elevated troponin, likely due to demand  -on ASA,statin     Atrial fibrillation  Not on A/c per notes due to bleeding risk and recurrent falls     Severe Dementia   -on donepezil and namenda  -on seroquel ,tagarmet and trazodone for agitation  -Psychiatry consulted-no inpt Psych admission recommended  Fortunately patient does seem to have severe dementia with behavioral disturbances, may be palliative hospice might be another option     HTN improved, monitor. Lethargy: feel likely related to oversedation due to seroquel and trazadone. Doses adjusted. Improved today      Diet: ADULT DIET; Regular  ADULT ORAL NUTRITION SUPPLEMENT; Breakfast, Lunch, Dinner, HS Snack; Fortified Pudding Oral Supplement  Code:Full Code  DVT PPX SCDs  Disposition  working with the family for disposition. Patient's discharge order has been placed     Brief Course:   80 y.o. male with dementia and recurrent falls  -presents from his SNF for suspected fall as the nursing staff has noticed some bruising on his face   - first day at this facility. - has history of wandering around and he was found in another resident's room wedged between the bed and the wall with possible bruise to his face. CC: confusion    Subjective:  .   With RN, no major overnight issues, patient sleeping, no tachycardia, blood pressure stable    Reviewed interval ancillary notes    Current Medications  QUEtiapine (SEROQUEL) tablet 50 mg, BID  cimetidine (TAGAMET) tablet 800 mg (Patient Supplied), BID  metoprolol (LOPRESSOR) injection 5 mg, Q6H  LORazepam (ATIVAN) tablet 1 mg, Q6H PRN  melatonin tablet 6 mg, Nightly PRN  aspirin EC tablet 81 mg, Daily  heparin (porcine) injection 5,000 Units, 3 times per day  memantine (NAMENDA) tablet 10 mg, BID  donepezil (ARICEPT) tablet 10 mg, Nightly  sodium chloride flush 0.9 % injection 5-40 mL, 2 times per day  sodium chloride flush 0.9 % injection 5-40 mL, PRN  0.9 % sodium chloride infusion, PRN  ondansetron (ZOFRAN-ODT) disintegrating tablet 4 mg, Q8H PRN   Or  ondansetron (ZOFRAN) injection 4 mg, Q6H PRN  polyethylene glycol (GLYCOLAX) packet 17 g, Daily PRN  acetaminophen (TYLENOL) tablet 650 mg, Q6H PRN   Or  acetaminophen (TYLENOL) suppository 650 mg, Q6H PRN  medroxyPROGESTERone (PROVERA) tablet 10 mg, BID  Venelex ointment, BID      Objective:  BP (!) 137/99   Pulse 84   Temp 97.8 °F (36.6 °C) (Oral)   Resp 18   Wt 199 lb 11.8 oz (90.6 kg)   SpO2 93%     Intake/Output Summary (Last 24 hours) at 10/8/2022 1319  Last data filed at 10/7/2022 1758  Gross per 24 hour   Intake 120 ml   Output 150 ml   Net -30 ml      Wt Readings from Last 3 Encounters:   10/08/22 199 lb 11.8 oz (90.6 kg)       General appearance:  Appears comfortable  Eyes: Sclera clear. Pupils equal.  ENT: Moist oral mucosa. Trachea midline, no adenopathy. Cardiovascular: Regular rhythm, normal S1, S2. No murmur. No edema in lower extremities  Respiratory: Not using accessory muscles. Good inspiratory effort. Clear to auscultation bilaterally, no wheeze or crackles. GI: Abdomen soft, no tenderness, not distended, normal bowel sounds  Musculoskeletal: No cyanosis in digits, neck supple  Patient does not follow any commands, patient is more comfortable as compared to yesterday  Skin: Warm, dry, normal turgor  Extremity exam shows brisk capillary refill.   Peripheral pulses are palpable in lower extremities     Labs and Tests:  CBC:   Recent Labs     10/07/22  0844   WBC 8.4   HGB 14.1        BMP:    Recent Labs     10/06/22  1933 10/07/22  0635 10/07/22  0844 10/08/22  1211    141  --  143   K 5.4* 5.6* 4.2 4.4    104  --  105   CO2 24 19*  --  28   BUN 13 14  --  13   CREATININE 1.1 1.1  --  1.0   GLUCOSE 116* 98  --  115*     Hepatic: No results for input(s): AST, ALT, ALB, BILITOT, ALKPHOS in the last 72 hours. XR CHEST PORTABLE   Final Result      No acute pulmonary disease. CT CSpine W/O Contrast   Final Result      1. Chronic bilateral cerebral convexity subdural hematomas. No midline shift. 2.  No acute intracranial abnormality. 3.  No evidence of acute fracture in the cervical spine. 4.  Large anterior osteophytes at C2-C5 level. Note that this is often incidental and asymptomatic but can contribute to dysphagia. CT Head W/O Contrast   Final Result      1. Chronic bilateral cerebral convexity subdural hematomas. No midline shift. 2.  No acute intracranial abnormality. 3.  No evidence of acute fracture in the cervical spine. 4.  Large anterior osteophytes at C2-C5 level. Note that this is often incidental and asymptomatic but can contribute to dysphagia.                   Gautam Cortez MD   10/8/2022 1:19 PM

## 2022-10-08 NOTE — CARE COORDINATION
Pt to discharge to [facility] at 6:00pm via [employer]. Referral made to [address] [place] [address]. They will set up a time to meet with daughter Lora Cook. Lora Cook in agreement with discharge plan.      Mariza Samuels RN, BSN, [address]  Case Management Department  [phone]

## 2022-10-08 NOTE — PROGRESS NOTES
Hospice nurse in pt's room talking to pt's daughter. Pt is agitated and being distracted by 1-on-1 PCA. Vitals stable.

## 2022-10-08 NOTE — DISCHARGE INSTR - COC
Continuity of Care Form    Patient Name: Cassie Goldsmith   :  1934  MRN:  [id number]    Admit date:  2022  Discharge date: 10/08/2022    Code Status Order: Full Code   Advance Directives:     Admitting Physician:  Rae Levine MD  PCP: Janey Vincent DO    Discharging Nurse: [facility] Unit/Room#: 6285/7376-23  Discharging Unit Phone Number: [phone]    Emergency Contact:   Extended Emergency Contact Information  Primary Emergency Contact: Adam Darnell Phone: [phone]  Relation: Child    Past Surgical History:  No past surgical history on file.     Immunization History:   Immunization History   Administered Date(s) Administered    Influenza Virus Vaccine 10/05/2018       Active Problems:  Patient Active Problem List   Diagnosis Code    Left hip pain M25.552    Leg hematoma, left, initial encounter S80.12XA    Chronic atrial fibrillation (HCC) I48.20    Acute on chronic systolic congestive heart failure (HCC) I50.23    Atrial fibrillation with rapid ventricular response ([facility]) I48.91    Acute cystitis without hematuria N30.00    Pleural effusion J90    Dehydration E86.0    Other frontotemporal dementia G31.09, F02.80    Vomiting R11.10    CHF (congestive heart failure) ([facility]) I50.9    Dyspnea R06.00    Hypoxia R09.02    YANY (acute kidney injury) ([facility] [address] N17.9       Isolation/Infection:   Isolation            No Isolation          Patient Infection Status       None to display            Nurse Assessment:  Last Vital Signs: BP (!) 154/74   Pulse 86   Temp 97.7 °F (36.5 °C) (Axillary)   Resp 16   Wt 199 lb 11.8 oz (90.6 kg)   SpO2 93%     Last documented pain score (0-10 scale): Pain Level: 0  Last Weight:   Wt Readings from Last 1 Encounters:   10/08/22 199 lb 11.8 oz (90.6 kg)     Mental Status:  disoriented    IV Access:  - none    Nursing Mobility/ADLs:  Walking   Dependent  Transfer  Assisted  Bathing  Dependent  Dressing  Dependent  Toileting Dependent  Feeding  Dependent  Med Admin  Dependent  Med Delivery   crushed    Wound Care Documentation and Therapy:  Wound 09/30/22 Sacrum Mid stage 2 (Active)   Wound Etiology Pressure Stage 1 10/06/22 1624   Dressing Status New dressing applied 10/02/22 0926   Dressing/Treatment Pharmaceutical agent (see MAR) 10/08/22 1200   Wound Assessment Pink/red 10/08/22 1200   Drainage Amount None 10/08/22 1200   Odor None 10/08/22 1200   Madisyn-wound Assessment Fragile 10/01/22 1617   Number of days: 8        Elimination:  Continence: Bowel: No  Bladder: No  Urinary Catheter: None   Colostomy/Ileostomy/Ileal Conduit: No       Date of Last BM: 10/08/2022    Intake/Output Summary (Last 24 hours) at 10/8/2022 1456  Last data filed at 10/8/2022 1442  Gross per 24 hour   Intake 320 ml   Output 675 ml   Net -355 ml     I/O last 3 completed shifts: In: 360 [P.O.:360]  Out: 600 [Urine:600]    Safety Concerns:     [employer] [place], History of Falls (last 30 days), and At Risk for Falls    Impairments/Disabilities:      Speech    Nutrition Therapy:  Current Nutrition Therapy:   - Oral Diet:  General  - Oral Nutrition Supplement:  Standard  four times a day    Routes of Feeding: Oral  Liquids: Thin Liquids  Daily Fluid Restriction: no  Last Modified Barium Swallow with Video (Video Swallowing Test): not done    Treatments at the Time of Hospital Discharge:   Respiratory Treatments: Regular  Oxygen Therapy:  is not on home oxygen therapy.   Ventilator:    - No ventilator support    Rehab Therapies: Physical Therapy and Occupational Therapy  Weight Bearing Status/Restrictions: No weight bearing restrictions  Other Medical Equipment (for information only, NOT a DME order):  wheelchair and walker  Other Treatments: none    Patient's personal belongings (please select all that are sent with patient):  None    RN SIGNATURE:  Electronically signed by Kim Pretty RN on 10/8/22 at 4:53 PM EDT    CASE MANAGEMENT/SOCIAL WORK SECTION    Inpatient Status Date: 9/30    Readmission Risk Assessment Score:  Readmission Risk              Risk of Unplanned Readmission:  20           Discharging to Facility/ Agency   Name: [facility]. [address]   Phone: [phone]  Fax: [phone]        / signature: Electronically signed by Julian Carson RN on 10/8/22 at 2:57 PM EDT    PHYSICIAN SECTION    Prognosis: Guarded    Condition at Discharge: Stable    Rehab Potential (if transferring to Rehab): Guarded    Recommended Labs or Other Treatments After Discharge:     PT/OT eval and treat as tolerated    Recommend palliative care and hospice follow up    Outpatient follow up with psychiatry     Physician Certification: I certify the above information and transfer of Antoinette Camarillo  is necessary for the continuing treatment of the diagnosis listed and that he requires [place] for greater 30 days.      Update Admission H&P: No change in H&P    PHYSICIAN SIGNATURE:  Electronically signed by Amalia Gavin MD on 10/8/22 at 3:10 PM EDT

## 2022-10-08 NOTE — PROGRESS NOTES
Cross coverage note:  Patient is being discharged today. I was asked to complete med rec and TONY to expedite discharge process. Discussed with primary day team and completed med rec and TONY. Discharge summary will be done by Dr. Tatiana Roberto.    Amalia Gavin MD

## 2022-10-08 NOTE — PROGRESS NOTES
Pt taken by transport for ride back to nursing facility. IV taken out without complications. Pt vitals stable. PT is currently calm and resting. Transport moved pt to their bed without issues. Pt discharged. Elsie has taken pt's personal belongings back to nursing facility.

## 2022-10-09 NOTE — DISCHARGE SUMMARY
HOSPITALISTS DISCHARGE SUMMARY    Patient Demographics    Patient. Merry Ralph  Date of Birth. 1934  MRN. [id number]     Primary care provider. Maryam Brown DO  (Tel: [phone])    Admit date: 9/29/2022    Discharge date (blank if same as Note Date): 10/8/2022  Note Date: 10/9/2022     Reason for Hospitalization. Chief Complaint   Patient presents with    Fall         Significant Findings. Principal Problem:    YANY (acute kidney injury) ([address]  Resolved Problems:    * No resolved hospital problems. *       Problems and results from this hospitalization that need follow up. Significant test results and incidental findings. XR CHEST PORTABLE   Final Result      No acute pulmonary disease. CT CSpine W/O Contrast   Final Result      1. Chronic bilateral cerebral convexity subdural hematomas. No midline shift. 2.  No acute intracranial abnormality. 3.  No evidence of acute fracture in the cervical spine. 4.  Large anterior osteophytes at C2-C5 level. Note that this is often incidental and asymptomatic but can contribute to dysphagia. CT Head W/O Contrast   Final Result      1. Chronic bilateral cerebral convexity subdural hematomas. No midline shift. 2.  No acute intracranial abnormality. 3.  No evidence of acute fracture in the cervical spine. 4.  Large anterior osteophytes at C2-C5 level. Note that this is often incidental and asymptomatic but can contribute to dysphagia. Invasive procedures and treatments. [facility] Course. 80-year-old gentleman nursing home resident was sent because of bruising on the face. Patient does seem to have a history of severe dementia. Patient was found to have chronic bilateral subdural hematoma with no midline shift and thought to have suspected acute cystitis. Neurosurgery evaluated the patient and as per them had CT showing bilateral hygromas.   There is no signs of compression on the brain tissue. And unlikely to be related to acute changes in mental status or balance. Psychiatric team also evaluated the patient during the stay and for most of the symptoms related to dementia. Patient will be discharged back to [facility]. Patient family did appeal the discharge. Given his severe dementia with behavior changes might benefit from palliative and hospice. Hospice liaison did evaluate the patient during the stay. States he went back for recheck. YANY did resolve. History of atrial fibrillation not on anticoagulation due to bleeding risk due to recurrent falls. Consults. IP CONSULT TO NEUROSURGERY  IP CONSULT TO HOSPITALIST  IP CONSULT TO SOCIAL WORK  IP CONSULT TO SOCIAL WORK  IP CONSULT TO PHARMACY  IP CONSULT TO PSYCHIATRY  IP CONSULT TO HOSPICE    Physical examination on discharge day. BP (!) 154/74   Pulse 86   Temp 97.7 °F (36.5 °C) (Axillary)   Resp 16   Wt 199 lb 11.8 oz (90.6 kg)   SpO2 93%   General appearance. Alert. Looks comfortable. HEENT. Sclera clear. Moist mucus membranes. Cardiovascular. Regular rate and rhythm, normal S1, S2. No murmur. Respiratory. Not using accessory muscles. Clear to auscultation bilaterally, no wheeze. Gastrointestinal. Abdomen soft, non-tender, not distended, normal bowel sounds  Moaning at times. Does not follow commands  Extremities. No edema in lower extremities. Skin. Warm, dry, normal turgor        Condition at time of discharge stable    Medication instructions provided to patient at discharge.      Medication List        START taking these medications      melatonin 3 MG Tabs tablet  Take 2 tablets by mouth nightly as needed (INSOMNIA)     polyethylene glycol 17 g packet  Commonly known as: GLYCOLAX  Take 17 g by mouth daily as needed for Constipation            CHANGE how you take these medications      QUEtiapine 50 MG tablet  Commonly known as: SEROQUEL  Take 1 tablet by mouth 2 times daily  What changed:   medication strength  when to take this  Another medication with the same name was removed. Continue taking this medication, and follow the directions you see here. traZODone 50 MG tablet  Commonly known as: DESYREL  Take 1 tablet by mouth nightly as needed for Sleep  What changed:   when to take this  reasons to take this            CONTINUE taking these medications      aspirin EC 81 MG EC tablet     carvedilol 12.5 MG tablet  Commonly known as: COREG     CIMETIDINE 200 PO     donepezil 10 MG tablet  Commonly known as: ARICEPT     ergocalciferol 1.25 MG (08970 UT) capsule  Commonly known as: ERGOCALCIFEROL     FLUoxetine 20 MG capsule  Commonly known as: PROZAC     furosemide 40 MG tablet  Commonly known as: LASIX     medroxyPROGESTERone 10 MG tablet  Commonly known as: PROVERA     memantine 10 MG tablet  Commonly known as: NAMENDA     montelukast 10 MG tablet  Commonly known as: SINGULAIR     potassium chloride 10 MEQ extended release capsule  Commonly known as: MICRO-K            STOP taking these medications      LORazepam 1 MG tablet  Commonly known as: ATIVAN               Where to Get Your Medications        These medications [facility], [address] [place] [phone] - F [phone] [address] [facility] [address] [facility] [address]      Phone: [phone]   QUEtiapine 50 MG tablet       Information about where to get these medications is not yet available    Ask your nurse or doctor about these medications  melatonin 3 MG Tabs tablet  polyethylene glycol 17 g packet  traZODone 50 MG tablet         Discharge recommendations given to patient. Follow Up. PCP  Disposition. long term care facility  Activity. activity as tolerated  Diet: No diet orders on file      Spent 35 minutes in discharge process.     Signed:  Hi Cabrera MD     10/9/2022 5:19 PM

## 2022-10-10 LAB
Lab: NORMAL
ORGANISM: ABNORMAL
REPORT: NORMAL
URINE CULTURE, ROUTINE: ABNORMAL